# Patient Record
Sex: MALE | Race: WHITE | NOT HISPANIC OR LATINO | Employment: FULL TIME | URBAN - METROPOLITAN AREA
[De-identification: names, ages, dates, MRNs, and addresses within clinical notes are randomized per-mention and may not be internally consistent; named-entity substitution may affect disease eponyms.]

---

## 2020-10-09 ENCOUNTER — OFFICE VISIT CONVERTED (OUTPATIENT)
Dept: UROLOGY | Facility: CLINIC | Age: 45
End: 2020-10-09
Attending: UROLOGY

## 2020-10-09 ENCOUNTER — CONVERSION ENCOUNTER (OUTPATIENT)
Dept: SURGERY | Facility: CLINIC | Age: 45
End: 2020-10-09

## 2021-05-10 NOTE — H&P
History and Physical      Patient Name: Deniz Calderon   Patient ID: [id number]   Sex: Male   YOB: 1975    Primary Care Provider: Alfonso Boo MD   Referring Provider: Alfonso Boo MD    Visit Date: October 9, 2020    Provider: Kehinde Arce MD   Location: [facility]   Location Address: [address] [place] [address]  [id number]   Location Phone: [phone]          Chief Complaint  · The patient is here for a vasectomy consultation.      History Of Present Illness  Deniz Calderon is here for counseling regarding vasectomy for permanent sterilization.   The procedure was discussed with the patient. Deniz Calderon is aware that the procedure should be considered irreversible, although at times it can be reversed with success. Risks for the procedure including local effects of selling, bleeding, pain, the possibility for recanalization, and continued fertility is also possible. Formation of a sperm granuloma also is a possibility. We discussed the procedure, preoperative preparation, and postoperative care. We also discussed the importance of continuing to use contraception post vasectomy, since the patient will not be sterile at that point. We stressed the importance of continuing to use contraception until a follow-up semen specimen is done that shows the absence of sperm. That specimen will normally be obtained eight weeks post-surgery.   Deniz Calderon is voluntarily requesting this procedure and understands that if it is successful he will be unable to father children.   He has no cardiopulmonary history, no prior scrotal surgery, no blood thinners.       Review of Systems  · Constitutional  o Denies  o : fever, chills  · Eyes  o Denies  o : double vision, cataracts  · HENT  o Denies  o : headaches, hearing loss  · Cardiovascular  o Denies  o : chest pain, irregular heart beats, dyspnea on exertion, chest pain on exertion  · Respiratory  o Denies  o : shortness  "of breath, wheezing, cough  · Gastrointestinal  o Denies  o : nausea, vomiting, diarrhea, heartburn  · Genitourinary  o Denies  o : scrotal mass, penile discharge, scrotal abnormalities  · Integument  o Denies  o : rash, itching, skin lesion or lump  · Endocrine  o Denies  o : weight gain, weight loss  · Psychiatric  o Denies  o : anxiety, depression, difficulty sleeping      Vitals  Date Time BP Position Site L\R Cuff Size HR RR TEMP (F) WT  HT  BMI kg/m2 BSA m2 O2 Sat FR L/min FiO2        10/09/2020 02:27 PM       17  272lbs 6oz 5'  8\" 41.41 2.43             Physical Examination  · Constitutional  o Appearance  o : well developed, well-nourished, in no acute distress.   · Neck  o Inspection/Palpation  o : normal appearance, no masses or tenderness, trachea midline  · Respiratory  o Respiratory Effort  o : breathing unlabored  o Auscultation of Lungs  o : clear to ascultation bilaterally  · Cardiovascular  o Heart  o :   § Auscultation of Heart  § : regular rate and rhythm, no murmurs  · Gastrointestinal  o Abdominal Examination  o : nontender, nondistended, no rigidity or gaurding,no hepatosplenomegaly  · Genitourinary  o Bladder  o : nonpalpable  o Penis  o : normal circumcised phallus with no masses or lesions  o Urethral Meatus  o : no inflammation present and no stenosis  o Scrotum and Scrotal Contents  o :   § Scrotum  § : bilateral vas were palpable  § Testes  § : descended testicles no masses or lesions  · Neurologic  o Mental Status Examination  o :   § Orientation  § : grossly oriented to person, place and time, judgement and insight intact, normal mood and affect          Assessment  · Consultation for sterilization     V25.09/Z30.09      Plan  · Medications  o Medications have been Reconciled  o Transition of Care or Provider Policy  · Instructions  o The patient will schedule a vasectomy if he desires.  o Handouts were provided, vasectomy  scanned into the EMR for reference.   o Vasectomy is " intended to be a permanent form of contraception.  o Vasectomy does not produce immediate sterility.  o Following vasectomy, another form of contraception is required until vas occlusion is confirmed by post-vasectomy semen analysis (PVSA).  o Even after vas occlusion is confirmed, vasectomy is not 100% reliable in preventing pregnancy.  o The risk of pregnancy after vasectomy is approximately 1 in 2,000 for men who have no sperm in the semen on post-vasectomy semen analysis showing rare non-motile sperm (RNMS).  o Repeat vasectomy is necessary in < 1% of vasectomies, provided that a technique for vas occlusion known to have low occlusive failure rate has been used.   o Patient's should refrain from ejaculation for approximately 1 week after vasectomy.  o Options for fertility after vasectomy reversal and sperm retrival with in vitro fertilization. These options are not always successful, and are very expensive.   o The rates of surgical complications such as symptomatic hematoma and infection are 1-2%.  o Chronic scrotal pain associated with negative impact on quality of life occurs after vasectomy in about 1-2% of men. Few of these men require addtional surgery.   o Other permanent and non-permanent alternatives to vasectomy are avaliable.  o Patient voiced understanding of the above statements.  o Electronically Identified Patient Education Materials Provided Electronically            Electronically Signed by: Kehinde Arce MD -Author on October 9, 2020 02:38:06 PM

## 2021-05-14 VITALS — WEIGHT: 272.37 LBS | BODY MASS INDEX: 41.28 KG/M2 | RESPIRATION RATE: 17 BRPM | HEIGHT: 68 IN

## 2022-02-28 ENCOUNTER — LAB (OUTPATIENT)
Dept: LAB | Facility: HOSPITAL | Age: 47
End: 2022-02-28

## 2022-02-28 ENCOUNTER — OFFICE VISIT (OUTPATIENT)
Dept: INTERNAL MEDICINE | Facility: CLINIC | Age: 47
End: 2022-02-28

## 2022-02-28 DIAGNOSIS — Z11.59 NEED FOR HEPATITIS C SCREENING TEST: ICD-10-CM

## 2022-02-28 DIAGNOSIS — E89.0 POSTABLATIVE HYPOTHYROIDISM: ICD-10-CM

## 2022-02-28 DIAGNOSIS — R53.83 FATIGUE, UNSPECIFIED TYPE: ICD-10-CM

## 2022-02-28 DIAGNOSIS — B27.90 MONOSPOT TEST POSITIVE: ICD-10-CM

## 2022-02-28 DIAGNOSIS — Z13.220 SCREENING FOR LIPID DISORDERS: ICD-10-CM

## 2022-02-28 DIAGNOSIS — J02.9 SORE THROAT: ICD-10-CM

## 2022-02-28 DIAGNOSIS — Z00.00 ANNUAL PHYSICAL EXAM: ICD-10-CM

## 2022-02-28 DIAGNOSIS — J06.9 VIRAL URI: ICD-10-CM

## 2022-02-28 DIAGNOSIS — Z76.89 ENCOUNTER TO ESTABLISH CARE: Primary | ICD-10-CM

## 2022-02-28 DIAGNOSIS — Z12.11 SCREENING FOR COLON CANCER: ICD-10-CM

## 2022-02-28 DIAGNOSIS — Z01.89 ROUTINE LAB DRAW: ICD-10-CM

## 2022-02-28 DIAGNOSIS — R79.89 ELEVATED LFTS: ICD-10-CM

## 2022-02-28 DIAGNOSIS — R74.8 ELEVATED LIPASE: ICD-10-CM

## 2022-02-28 PROBLEM — E66.812 CLASS 2 OBESITY WITH BODY MASS INDEX (BMI) OF 37.0 TO 37.9 IN ADULT: Status: ACTIVE | Noted: 2021-05-09

## 2022-02-28 PROBLEM — Z83.49 FHX: THYROID DISEASE: Status: ACTIVE | Noted: 2020-10-24

## 2022-02-28 PROBLEM — Z13.6 SCREENING FOR HYPERTENSION: Status: ACTIVE | Noted: 2020-10-24

## 2022-02-28 PROBLEM — E05.00 GRAVES' DISEASE: Status: ACTIVE | Noted: 2020-10-24

## 2022-02-28 PROBLEM — E66.9 CLASS 2 OBESITY WITH BODY MASS INDEX (BMI) OF 37.0 TO 37.9 IN ADULT: Status: ACTIVE | Noted: 2021-05-09

## 2022-02-28 PROBLEM — J30.2 SEASONAL ALLERGIC RHINITIS: Status: ACTIVE | Noted: 2022-02-28

## 2022-02-28 PROBLEM — Z92.3 S/P RADIOACTIVE IODINE THYROID ABLATION: Status: ACTIVE | Noted: 2020-10-24

## 2022-02-28 PROBLEM — E03.9 HYPOTHYROIDISM: Status: ACTIVE | Noted: 2020-10-24

## 2022-02-28 PROBLEM — K21.9 GERD (GASTROESOPHAGEAL REFLUX DISEASE): Status: ACTIVE | Noted: 2020-10-24

## 2022-02-28 LAB
25(OH)D3 SERPL-MCNC: 44.7 NG/ML (ref 30–100)
ALBUMIN SERPL-MCNC: 4.5 G/DL (ref 3.5–5.2)
ALBUMIN/GLOB SERPL: 1.3 G/DL
ALP SERPL-CCNC: 538 U/L (ref 39–117)
ALT SERPL W P-5'-P-CCNC: 251 U/L (ref 1–41)
ANION GAP SERPL CALCULATED.3IONS-SCNC: 14.5 MMOL/L (ref 5–15)
AST SERPL-CCNC: 116 U/L (ref 1–40)
BACTERIA UR QL AUTO: NORMAL /HPF
BILIRUB SERPL-MCNC: 0.5 MG/DL (ref 0–1.2)
BILIRUB UR QL STRIP: NEGATIVE
BUN SERPL-MCNC: 10 MG/DL (ref 6–20)
BUN/CREAT SERPL: 9.1 (ref 7–25)
CALCIUM SPEC-SCNC: 9.7 MG/DL (ref 8.6–10.5)
CHLORIDE SERPL-SCNC: 103 MMOL/L (ref 98–107)
CHOLEST SERPL-MCNC: 176 MG/DL (ref 0–200)
CLARITY UR: CLEAR
CO2 SERPL-SCNC: 20.5 MMOL/L (ref 22–29)
COLOR UR: YELLOW
CREAT SERPL-MCNC: 1.1 MG/DL (ref 0.76–1.27)
DEPRECATED RDW RBC AUTO: 40.8 FL (ref 37–54)
EGFRCR SERPLBLD CKD-EPI 2021: 84.4 ML/MIN/1.73
ERYTHROCYTE [DISTWIDTH] IN BLOOD BY AUTOMATED COUNT: 12.9 % (ref 12.3–15.4)
EXPIRATION DATE: NORMAL
EXPIRATION DATE: NORMAL
FLUAV AG NPH QL: NEGATIVE
FLUBV AG NPH QL: NEGATIVE
FOLATE SERPL-MCNC: 17.9 NG/ML (ref 4.78–24.2)
GLOBULIN UR ELPH-MCNC: 3.4 GM/DL
GLUCOSE SERPL-MCNC: 76 MG/DL (ref 65–99)
GLUCOSE UR STRIP-MCNC: NEGATIVE MG/DL
HCT VFR BLD AUTO: 47 % (ref 37.5–51)
HCV AB SER DONR QL: NORMAL
HDLC SERPL-MCNC: 38 MG/DL (ref 40–60)
HGB BLD-MCNC: 16.2 G/DL (ref 13–17.7)
HGB UR QL STRIP.AUTO: NEGATIVE
HYALINE CASTS UR QL AUTO: NORMAL /LPF
INTERNAL CONTROL: NORMAL
INTERNAL CONTROL: NORMAL
KETONES UR QL STRIP: NEGATIVE
LDLC SERPL CALC-MCNC: 108 MG/DL (ref 0–100)
LDLC/HDLC SERPL: 2.74 {RATIO}
LEUKOCYTE ESTERASE UR QL STRIP.AUTO: NEGATIVE
LYMPHOCYTES # BLD MANUAL: 10.4 10*3/MM3 (ref 0.7–3.1)
LYMPHOCYTES NFR BLD MANUAL: 5 % (ref 5–12)
Lab: NORMAL
Lab: NORMAL
MCH RBC QN AUTO: 30.3 PG (ref 26.6–33)
MCHC RBC AUTO-ENTMCNC: 34.5 G/DL (ref 31.5–35.7)
MCV RBC AUTO: 88 FL (ref 79–97)
MONOCYTES # BLD: 0.8 10*3/MM3 (ref 0.1–0.9)
NEUTROPHILS # BLD AUTO: 4.8 10*3/MM3 (ref 1.7–7)
NEUTROPHILS NFR BLD MANUAL: 30 % (ref 42.7–76)
NITRITE UR QL STRIP: NEGATIVE
PH UR STRIP.AUTO: 5.5 [PH] (ref 5–8)
PLAT MORPH BLD: NORMAL
PLATELET # BLD AUTO: 250 10*3/MM3 (ref 140–450)
PMV BLD AUTO: 10 FL (ref 6–12)
POTASSIUM SERPL-SCNC: 4.6 MMOL/L (ref 3.5–5.2)
PROT SERPL-MCNC: 7.9 G/DL (ref 6–8.5)
PROT UR QL STRIP: NEGATIVE
RBC # BLD AUTO: 5.34 10*6/MM3 (ref 4.14–5.8)
RBC # UR STRIP: NORMAL /HPF
RBC MORPH BLD: NORMAL
REF LAB TEST METHOD: NORMAL
S PYO AG THROAT QL: NEGATIVE
SARS-COV-2 RNA PNL SPEC NAA+PROBE: NOT DETECTED
SODIUM SERPL-SCNC: 138 MMOL/L (ref 136–145)
SP GR UR STRIP: 1.02 (ref 1–1.03)
SQUAMOUS #/AREA URNS HPF: NORMAL /HPF
T4 FREE SERPL-MCNC: 1.63 NG/DL (ref 0.93–1.7)
TRIGL SERPL-MCNC: 170 MG/DL (ref 0–150)
TSH SERPL DL<=0.05 MIU/L-ACNC: 0.71 UIU/ML (ref 0.27–4.2)
UROBILINOGEN UR QL STRIP: NORMAL
VARIANT LYMPHS NFR BLD MANUAL: 20 % (ref 0–5)
VARIANT LYMPHS NFR BLD MANUAL: 45 % (ref 19.6–45.3)
VIT B12 BLD-MCNC: 1919 PG/ML (ref 211–946)
VLDLC SERPL-MCNC: 30 MG/DL (ref 5–40)
WBC # UR STRIP: NORMAL /HPF
WBC MORPH BLD: NORMAL
WBC NRBC COR # BLD: 16 10*3/MM3 (ref 3.4–10.8)

## 2022-02-28 PROCEDURE — 85007 BL SMEAR W/DIFF WBC COUNT: CPT

## 2022-02-28 PROCEDURE — 99386 PREV VISIT NEW AGE 40-64: CPT

## 2022-02-28 PROCEDURE — 87880 STREP A ASSAY W/OPTIC: CPT

## 2022-02-28 PROCEDURE — 84439 ASSAY OF FREE THYROXINE: CPT

## 2022-02-28 PROCEDURE — 82607 VITAMIN B-12: CPT

## 2022-02-28 PROCEDURE — 80061 LIPID PANEL: CPT

## 2022-02-28 PROCEDURE — 82306 VITAMIN D 25 HYDROXY: CPT

## 2022-02-28 PROCEDURE — 81001 URINALYSIS AUTO W/SCOPE: CPT

## 2022-02-28 PROCEDURE — 36415 COLL VENOUS BLD VENIPUNCTURE: CPT

## 2022-02-28 PROCEDURE — C9803 HOPD COVID-19 SPEC COLLECT: HCPCS

## 2022-02-28 PROCEDURE — 86803 HEPATITIS C AB TEST: CPT

## 2022-02-28 PROCEDURE — 82746 ASSAY OF FOLIC ACID SERUM: CPT

## 2022-02-28 PROCEDURE — 83690 ASSAY OF LIPASE: CPT

## 2022-02-28 PROCEDURE — U0004 COV-19 TEST NON-CDC HGH THRU: HCPCS

## 2022-02-28 PROCEDURE — 87804 INFLUENZA ASSAY W/OPTIC: CPT

## 2022-02-28 PROCEDURE — 80050 GENERAL HEALTH PANEL: CPT

## 2022-02-28 PROCEDURE — 82150 ASSAY OF AMYLASE: CPT

## 2022-02-28 PROCEDURE — 86308 HETEROPHILE ANTIBODY SCREEN: CPT

## 2022-02-28 RX ORDER — MULTIPLE VITAMINS W/ MINERALS TAB 9MG-400MCG
1 TAB ORAL DAILY
COMMUNITY

## 2022-02-28 RX ORDER — LEVOTHYROXINE SODIUM 175 UG/1
150 TABLET ORAL DAILY
COMMUNITY
Start: 2021-11-03 | End: 2023-03-21

## 2022-02-28 RX ORDER — UBIDECARENONE 75 MG
50 CAPSULE ORAL EVERY OTHER DAY
COMMUNITY
End: 2023-03-21

## 2022-02-28 RX ORDER — PREDNISONE 20 MG/1
20 TABLET ORAL 2 TIMES DAILY
Qty: 10 TABLET | Refills: 0 | Status: SHIPPED | OUTPATIENT
Start: 2022-02-28 | End: 2022-03-05

## 2022-02-28 RX ORDER — MELATONIN
1000 DAILY
COMMUNITY
End: 2023-03-21

## 2022-02-28 NOTE — ASSESSMENT & PLAN NOTE
He is followed by Dr. Chou, s/p radioactive iodine thyroid ablation.   He continues with synthroid 175 mcg qam.  Dr. Chou monitors thyroid labs q6 months.   Continue with Dr. Effie curry.

## 2022-02-28 NOTE — ASSESSMENT & PLAN NOTE
Pt complains of sore throat and headache. He states that he started with symptoms about a week and half asgo. He states he work up in the middle of the night with abdominal pain and ended up vomiting. He thought that it was something he ate. That resolved but he continued having headaches, night sweats/chills, and now he is having a really sore throat.  He complains of ear fullness and some nasal congestion. He denies soa, chest tightness, or cough. He denies any current nausea, vomiting or diarrhea. Wife tested positive for COVID not too long ago.  Flu and Strep in office were negative.   Plan: Pt to get labs and covid swab. I will send in chloraseptic spray as well. Pt may take tylenol/motrin for pain. He will also start claritin/zyrtec.  F/u in 1 week.

## 2022-02-28 NOTE — PROGRESS NOTES
Chief Complaint  Headache (Off and on x 1 1/2 weeks), Night Sweats (Horrible), Fatigue, Sore Throat (x 2 days, painful to swallow, no cough), and Chills (Not recent, no fever)    Subjective          History of Present Illness  Deniz Calderon presents to [facility]  To establish care.     Medical problems include Graves' disease, s/p thyroid ablation.     He is followed by Dr. Chou.     He is c/o of sore throat and headache. Started with symptoms 10 days ago. He also has had night sweats and chills. No known fever, denies soa, cough.     Tobacco use-No  He does exercise.   Colon-We will order  COVID-19-UTD  Flu-Fall 2021    Denies cp, soa.   Objective   Vital Signs:   There were no vitals taken for this visit.    Physical Exam  HENT:      Head: Normocephalic and atraumatic.      Right Ear: A middle ear effusion is present.      Left Ear: A middle ear effusion is present.      Nose:      Right Sinus: No maxillary sinus tenderness or frontal sinus tenderness.      Left Sinus: No maxillary sinus tenderness or frontal sinus tenderness.      Mouth/Throat:      Pharynx: Oropharyngeal exudate and posterior oropharyngeal erythema present.   Eyes:      Extraocular Movements: Extraocular movements intact.      Conjunctiva/sclera: Conjunctivae normal.   Cardiovascular:      Rate and Rhythm: Tachycardia present.      Pulses: Normal pulses.   Pulmonary:      Effort: Pulmonary effort is normal. No respiratory distress.      Breath sounds: Normal breath sounds. No stridor. No wheezing, rhonchi or rales.   Abdominal:      General: Bowel sounds are normal. There is no distension.      Palpations: Abdomen is soft. There is no mass.      Tenderness: There is no abdominal tenderness.      Hernia: No hernia is present.   Musculoskeletal:         General: Normal range of motion.      Cervical back: Normal range of motion and neck supple.   Skin:     General: Skin is warm and dry.   Neurological:       Mental Status: He is alert and oriented to person, place, and time.   Psychiatric:         Mood and Affect: Mood normal.         Behavior: Behavior normal.         Thought Content: Thought content normal.         Judgment: Judgment normal.        Result Review :                 Assessment and Plan    Diagnoses and all orders for this visit:    1. Encounter to establish care (Primary)    2. Postablative hypothyroidism  Assessment & Plan:  He is followed by Dr. Chou, s/p radioactive iodine thyroid ablation.   He continues with synthroid 175 mcg qam.  Dr. Chou monitors thyroid labs q6 months.   Continue with Dr. Effie curry.     Orders:  -     TSH+Free T4    3. Viral URI  Assessment & Plan:  Pt complains of sore throat and headache. He states that he started with symptoms about a week and half asgo. He states he work up in the middle of the night with abdominal pain and ended up vomiting. He thought that it was something he ate. That resolved but he continued having headaches, night sweats/chills, and now he is having a really sore throat.  He complains of ear fullness and some nasal congestion. He denies soa, chest tightness, or cough. He denies any current nausea, vomiting or diarrhea. Wife tested positive for COVID not too long ago.  Flu and Strep in office were negative.   Plan: Pt to get labs and covid swab. I will send in chloraseptic spray as well. Pt may take tylenol/motrin for pain. He will also start claritin/zyrtec.  F/u in 1 week.       Orders:  -     POCT Influenza A/B  -     POCT rapid strep A    4. Need for hepatitis C screening test  -     Hepatitis C antibody    5. Annual physical exam    6. Routine lab draw  -     Comprehensive Metabolic Panel  -     Urinalysis With Microscopic - Urine, Clean Catch    7. Fatigue, unspecified type  -     CBC & Differential  -     Vitamin B12 & Folate  -     Vitamin D 25 Hydroxy  -     POCT Influenza A/B  -     POCT rapid strep A  -     Manual Differential    8.  Screening for lipid disorders  -     Lipid Panel    9. Sore throat  -     COVID-19,APTIMA PANTHER(AMADO),[facility]/ JULIOCESAR, NP/OP SWAB IN UTM/VTM/[facility] TRANSPORT MEDIA,24 HR TAT - Swab, Nasopharynx  -     POCT Influenza A/B  -     POCT rapid strep A    10. Screening for colon cancer  -     Cologuard - Stool, Per Rectum; Future    Other orders  -     phenol (CHLORASEPTIC) 1.4 % liquid liquid; Apply 2 sprays to the mouth or throat Every 2 (Two) Hours As Needed (sore throat).  Dispense: 120 mL; Refill: 0  -     predniSONE (DELTASONE) 20 MG tablet; Take 1 tablet by mouth 2 (Two) Times a Day for 5 days.  Dispense: 10 tablet; Refill: 0      Follow Up   Return in about 1 week (around 3/7/2022) for Recheck.  Patient was given instructions and counseling regarding his condition or for health maintenance advice. Please see specific information pulled into the AVS if appropriate.

## 2022-03-01 DIAGNOSIS — R74.8 ABNORMAL SERUM LEVEL OF LIPASE: ICD-10-CM

## 2022-03-01 DIAGNOSIS — Z83.49 FHX: THYROID DISEASE: Primary | ICD-10-CM

## 2022-03-01 DIAGNOSIS — Z13.220 SCREENING FOR LIPID DISORDERS: ICD-10-CM

## 2022-03-01 DIAGNOSIS — R79.89 ELEVATED LIVER FUNCTION TESTS: ICD-10-CM

## 2022-03-01 LAB
AMYLASE SERPL-CCNC: 92 U/L (ref 28–100)
LIPASE SERPL-CCNC: 73 U/L (ref 13–60)

## 2022-03-02 ENCOUNTER — APPOINTMENT (OUTPATIENT)
Dept: CT IMAGING | Facility: HOSPITAL | Age: 47
End: 2022-03-02

## 2022-03-02 ENCOUNTER — LAB (OUTPATIENT)
Dept: LAB | Facility: HOSPITAL | Age: 47
End: 2022-03-02

## 2022-03-02 ENCOUNTER — ANCILLARY ORDERS (OUTPATIENT)
Dept: INTERNAL MEDICINE | Facility: CLINIC | Age: 47
End: 2022-03-02

## 2022-03-02 ENCOUNTER — TELEPHONE (OUTPATIENT)
Dept: INTERNAL MEDICINE | Facility: CLINIC | Age: 47
End: 2022-03-02

## 2022-03-02 ENCOUNTER — HOSPITAL ENCOUNTER (OUTPATIENT)
Dept: ULTRASOUND IMAGING | Facility: HOSPITAL | Age: 47
Discharge: HOME | End: 2022-03-02

## 2022-03-02 ENCOUNTER — HOSPITAL ENCOUNTER (OUTPATIENT)
Dept: CT IMAGING | Facility: HOSPITAL | Age: 47
Discharge: HOME | End: 2022-03-02

## 2022-03-02 DIAGNOSIS — J02.9 SORE THROAT: ICD-10-CM

## 2022-03-02 DIAGNOSIS — R16.2 HEPATOSPLENOMEGALY: Primary | ICD-10-CM

## 2022-03-02 DIAGNOSIS — R79.89 ELEVATED LFTS: ICD-10-CM

## 2022-03-02 DIAGNOSIS — R79.89 ELEVATED LIVER FUNCTION TESTS: ICD-10-CM

## 2022-03-02 DIAGNOSIS — R74.8 ABNORMAL SERUM LEVEL OF LIPASE: ICD-10-CM

## 2022-03-02 DIAGNOSIS — R53.83 FATIGUE, UNSPECIFIED TYPE: ICD-10-CM

## 2022-03-02 DIAGNOSIS — B27.90 MONOSPOT TEST POSITIVE: ICD-10-CM

## 2022-03-02 LAB
ALBUMIN SERPL-MCNC: 4.2 G/DL (ref 3.5–5.2)
ALBUMIN/GLOB SERPL: 1.1 G/DL
ALP SERPL-CCNC: 417 U/L (ref 39–117)
ALPHA-FETOPROTEIN: 0.97 NG/ML (ref 0–8.3)
ALT SERPL W P-5'-P-CCNC: 195 U/L (ref 1–41)
ANION GAP SERPL CALCULATED.3IONS-SCNC: 14.3 MMOL/L (ref 5–15)
AST SERPL-CCNC: 74 U/L (ref 1–40)
BILIRUB SERPL-MCNC: 0.4 MG/DL (ref 0–1.2)
BUN SERPL-MCNC: 16 MG/DL (ref 6–20)
BUN/CREAT SERPL: 14.2 (ref 7–25)
CALCIUM SPEC-SCNC: 9.7 MG/DL (ref 8.6–10.5)
CERULOPLASMIN SERPL-MCNC: 38 MG/DL (ref 16–31)
CHLORIDE SERPL-SCNC: 102 MMOL/L (ref 98–107)
CO2 SERPL-SCNC: 23.7 MMOL/L (ref 22–29)
CREAT SERPL-MCNC: 1.13 MG/DL (ref 0.76–1.27)
EGFRCR SERPLBLD CKD-EPI 2021: 81.7 ML/MIN/1.73
FERRITIN SERPL-MCNC: 496.4 NG/ML (ref 30–400)
GLOBULIN UR ELPH-MCNC: 3.9 GM/DL
GLUCOSE SERPL-MCNC: 92 MG/DL (ref 65–99)
HAV IGM SERPL QL IA: NORMAL
HBV CORE IGM SERPL QL IA: NORMAL
HBV SURFACE AG SERPL QL IA: NORMAL
HCV AB SER DONR QL: NORMAL
HETEROPH AB SER QL LA: POSITIVE
INR PPP: 1.03 (ref 2–3)
IRON 24H UR-MRATE: 148 MCG/DL (ref 59–158)
IRON SATN MFR SERPL: 38 % (ref 20–50)
POTASSIUM SERPL-SCNC: 4.3 MMOL/L (ref 3.5–5.2)
PROT SERPL-MCNC: 8.1 G/DL (ref 6–8.5)
PROTHROMBIN TIME: 10.8 SECONDS (ref 9.4–12)
SODIUM SERPL-SCNC: 140 MMOL/L (ref 136–145)
TIBC SERPL-MCNC: 384 MCG/DL (ref 298–536)
TRANSFERRIN SERPL-MCNC: 258 MG/DL (ref 200–360)

## 2022-03-02 PROCEDURE — 82728 ASSAY OF FERRITIN: CPT

## 2022-03-02 PROCEDURE — 86381 MITOCHONDRIAL ANTIBODY EACH: CPT

## 2022-03-02 PROCEDURE — 74177 CT ABD & PELVIS W/CONTRAST: CPT

## 2022-03-02 PROCEDURE — 86663 EPSTEIN-BARR ANTIBODY: CPT

## 2022-03-02 PROCEDURE — 85610 PROTHROMBIN TIME: CPT

## 2022-03-02 PROCEDURE — 84156 ASSAY OF PROTEIN URINE: CPT

## 2022-03-02 PROCEDURE — 0 IOPAMIDOL PER 1 ML

## 2022-03-02 PROCEDURE — 80053 COMPREHEN METABOLIC PANEL: CPT

## 2022-03-02 PROCEDURE — 36415 COLL VENOUS BLD VENIPUNCTURE: CPT

## 2022-03-02 PROCEDURE — 86665 EPSTEIN-BARR CAPSID VCA: CPT

## 2022-03-02 PROCEDURE — 82390 ASSAY OF CERULOPLASMIN: CPT

## 2022-03-02 PROCEDURE — 86038 ANTINUCLEAR ANTIBODIES: CPT

## 2022-03-02 PROCEDURE — 86225 DNA ANTIBODY NATIVE: CPT

## 2022-03-02 PROCEDURE — 83540 ASSAY OF IRON: CPT

## 2022-03-02 PROCEDURE — 86664 EPSTEIN-BARR NUCLEAR ANTIGEN: CPT

## 2022-03-02 PROCEDURE — 80074 ACUTE HEPATITIS PANEL: CPT

## 2022-03-02 PROCEDURE — 84166 PROTEIN E-PHORESIS/URINE/CSF: CPT

## 2022-03-02 PROCEDURE — 82105 ALPHA-FETOPROTEIN SERUM: CPT

## 2022-03-02 PROCEDURE — 84466 ASSAY OF TRANSFERRIN: CPT

## 2022-03-02 PROCEDURE — 86335 IMMUNFIX E-PHORSIS/URINE/CSF: CPT

## 2022-03-02 RX ADMIN — IOPAMIDOL 100 ML: 755 INJECTION, SOLUTION INTRAVENOUS at 13:04

## 2022-03-03 ENCOUNTER — HOSPITAL ENCOUNTER (OUTPATIENT)
Dept: ULTRASOUND IMAGING | Facility: HOSPITAL | Age: 47
Discharge: HOME | End: 2022-03-03

## 2022-03-03 LAB
DSDNA IGG SERPL IA-ACNC: NEGATIVE [IU]/ML
EBV EA IGG SER-ACNC: >150 U/ML (ref 0–8.9)
EBV NA IGG SER IA-ACNC: <18 U/ML (ref 0–17.9)
EBV VCA IGG SER IA-ACNC: 139 U/ML (ref 0–17.9)
EBV VCA IGM SER IA-ACNC: >160 U/ML (ref 0–35.9)
MITOCHONDRIA M2 IGG SER-ACNC: <20 UNITS (ref 0–20)
NUCLEAR IGG SER IA-RTO: NEGATIVE

## 2022-03-03 PROCEDURE — 76705 ECHO EXAM OF ABDOMEN: CPT

## 2022-03-04 ENCOUNTER — PATIENT ROUNDING (BHMG ONLY) (OUTPATIENT)
Dept: INTERNAL MEDICINE | Facility: CLINIC | Age: 47
End: 2022-03-04

## 2022-03-04 LAB
ALBUMIN 24H MFR UR ELPH: 27.6 %
ALPHA1 GLOB 24H MFR UR ELPH: 4.2 %
ALPHA2 GLOB 24H MFR UR ELPH: 23.9 %
B-GLOBULIN MFR UR ELPH: 23.4 %
GAMMA GLOB 24H MFR UR ELPH: 21 %
HIV 1 & 2 AB SER-IMP: NORMAL
INTERPRETATION UR IFE-IMP: NORMAL
M PROTEIN 24H MFR UR ELPH: NORMAL %
PROT UR-MCNC: 15.9 MG/DL

## 2022-03-04 NOTE — PROGRESS NOTES
March 4, 2022    Hello, may I speak with Deniz Calderon?    My name is Lila Mast     I am  with [facility]  [address] [address].    Before we get started may I verify your date of birth? 1975    I am calling to officially welcome you to our practice and ask about your recent visit. Is this a good time to talk? yes    Tell me about your visit with us. What things went well?  Went well, no complaints       We're always looking for ways to make our patients' experiences even better. Do you have recommendations on ways we may improve?  no    Overall were you satisfied with your first visit to our practice? yes       I appreciate you taking the time to speak with me today. Is there anything else I can do for you? no      Thank you, and have a great day.

## 2022-03-07 ENCOUNTER — LAB (OUTPATIENT)
Dept: LAB | Facility: HOSPITAL | Age: 47
End: 2022-03-07

## 2022-03-07 ENCOUNTER — OFFICE VISIT (OUTPATIENT)
Dept: INTERNAL MEDICINE | Facility: CLINIC | Age: 47
End: 2022-03-07

## 2022-03-07 VITALS
DIASTOLIC BLOOD PRESSURE: 76 MMHG | OXYGEN SATURATION: 97 % | HEART RATE: 76 BPM | HEIGHT: 69 IN | WEIGHT: 234.6 LBS | SYSTOLIC BLOOD PRESSURE: 118 MMHG | TEMPERATURE: 98.4 F | BODY MASS INDEX: 34.75 KG/M2

## 2022-03-07 DIAGNOSIS — R79.89 ELEVATED LFTS: Primary | ICD-10-CM

## 2022-03-07 DIAGNOSIS — R16.2 HEPATOSPLENOMEGALY: ICD-10-CM

## 2022-03-07 LAB
ALBUMIN SERPL-MCNC: 4.3 G/DL (ref 3.5–5.2)
ALBUMIN/GLOB SERPL: 1.4 G/DL
ALP SERPL-CCNC: 226 U/L (ref 39–117)
ALT SERPL W P-5'-P-CCNC: 67 U/L (ref 1–41)
ANION GAP SERPL CALCULATED.3IONS-SCNC: 9.9 MMOL/L (ref 5–15)
AST SERPL-CCNC: 29 U/L (ref 1–40)
BASOPHILS # BLD MANUAL: 0.09 10*3/MM3 (ref 0–0.2)
BASOPHILS NFR BLD MANUAL: 1 % (ref 0–1.5)
BILIRUB SERPL-MCNC: 0.3 MG/DL (ref 0–1.2)
BUN SERPL-MCNC: 18 MG/DL (ref 6–20)
BUN/CREAT SERPL: 17.6 (ref 7–25)
CALCIUM SPEC-SCNC: 9.6 MG/DL (ref 8.6–10.5)
CHLORIDE SERPL-SCNC: 105 MMOL/L (ref 98–107)
CO2 SERPL-SCNC: 25.1 MMOL/L (ref 22–29)
CREAT SERPL-MCNC: 1.02 MG/DL (ref 0.76–1.27)
DEPRECATED RDW RBC AUTO: 41.4 FL (ref 37–54)
EGFRCR SERPLBLD CKD-EPI 2021: 92.4 ML/MIN/1.73
EOSINOPHIL # BLD MANUAL: 0.17 10*3/MM3 (ref 0–0.4)
EOSINOPHIL NFR BLD MANUAL: 2 % (ref 0.3–6.2)
ERYTHROCYTE [DISTWIDTH] IN BLOOD BY AUTOMATED COUNT: 12.9 % (ref 12.3–15.4)
GLOBULIN UR ELPH-MCNC: 3.1 GM/DL
GLUCOSE SERPL-MCNC: 75 MG/DL (ref 65–99)
HCT VFR BLD AUTO: 46.1 % (ref 37.5–51)
HGB BLD-MCNC: 15.5 G/DL (ref 13–17.7)
INR PPP: 1.06 (ref 2–3)
LYMPHOCYTES # BLD MANUAL: 5.36 10*3/MM3 (ref 0.7–3.1)
LYMPHOCYTES NFR BLD MANUAL: 6 % (ref 5–12)
MCH RBC QN AUTO: 30 PG (ref 26.6–33)
MCHC RBC AUTO-ENTMCNC: 33.6 G/DL (ref 31.5–35.7)
MCV RBC AUTO: 89.2 FL (ref 79–97)
MONOCYTES # BLD: 0.51 10*3/MM3 (ref 0.1–0.9)
NEUTROPHILS # BLD AUTO: 2.38 10*3/MM3 (ref 1.7–7)
NEUTROPHILS NFR BLD MANUAL: 28 % (ref 42.7–76)
PLAT MORPH BLD: NORMAL
PLATELET # BLD AUTO: 389 10*3/MM3 (ref 140–450)
PMV BLD AUTO: 9.2 FL (ref 6–12)
POTASSIUM SERPL-SCNC: 4.9 MMOL/L (ref 3.5–5.2)
PROT SERPL-MCNC: 7.4 G/DL (ref 6–8.5)
PROTHROMBIN TIME: 11 SECONDS (ref 9.4–12)
RBC # BLD AUTO: 5.17 10*6/MM3 (ref 4.14–5.8)
RBC MORPH BLD: NORMAL
SODIUM SERPL-SCNC: 140 MMOL/L (ref 136–145)
VARIANT LYMPHS NFR BLD MANUAL: 63 % (ref 19.6–45.3)
WBC MORPH BLD: NORMAL
WBC NRBC COR # BLD: 8.5 10*3/MM3 (ref 3.4–10.8)

## 2022-03-07 PROCEDURE — 80053 COMPREHEN METABOLIC PANEL: CPT

## 2022-03-07 PROCEDURE — 36415 COLL VENOUS BLD VENIPUNCTURE: CPT

## 2022-03-07 PROCEDURE — 99213 OFFICE O/P EST LOW 20 MIN: CPT

## 2022-03-07 PROCEDURE — 85025 COMPLETE CBC W/AUTO DIFF WBC: CPT

## 2022-03-07 PROCEDURE — 85007 BL SMEAR W/DIFF WBC COUNT: CPT

## 2022-03-07 PROCEDURE — 85610 PROTHROMBIN TIME: CPT

## 2022-03-07 NOTE — ASSESSMENT & PLAN NOTE
Patient had elevated LFTs on recent labs. Additional labs were ordered to investigate the cause of the elevated LFTs. I rechecked him a couple days later and they are trending down.  Patient had a CT abdomen and it did show hepatosplenomegaly.  He had a right upper quadrant ultrasound and that was unremarkable.    Mono spot and EBV panel came back positive.  Discussed case with GI and referral has been sent.   Patient denies any abdominal pain, nausea, vomiting, diarrhea.  Plan: Monitor labs until patient follows up with GI.  Patient counseled.  Avoid physical activity until follow-up with gastro.

## 2022-03-07 NOTE — PROGRESS NOTES
"Chief Complaint  Follow-up (US and Ct was done.) and Labs Only    Subjective          History of Present Illness  Deniz Calderon presents to [facility]  To follow up and discuss labs.   He states he is feeling much better overall. Headache has resolved and sore throat is much improved.  Recent labs showed elevated LFT's and Lipase; therefore, further labs and imaging studies were ordered.   CT abd showed hepatosplenomegaly and RUQ US was unremarkable.  Pt denies any abdominal pain, nausea, vomiting, or diarrhea.  He denies any chest pain, soa or palpitations.    Denies excess alcohol use.  Objective   Vital Signs:   /76 (BP Location: Right arm, Patient Position: Sitting, Cuff Size: Large Adult)   Pulse 76   Temp 98.4 °F (36.9 °C) (Temporal)   Ht 174 cm (68.5\")   Wt 106 kg (234 lb 9.6 oz)   SpO2 97%   BMI 35.15 kg/m²     Physical Exam  Constitutional:       Appearance: Normal appearance.   Eyes:      Extraocular Movements: Extraocular movements intact.      Conjunctiva/sclera: Conjunctivae normal.   Cardiovascular:      Rate and Rhythm: Normal rate and regular rhythm.      Heart sounds: Normal heart sounds. No murmur heard.  Pulmonary:      Effort: Pulmonary effort is normal.      Breath sounds: Normal breath sounds.   Abdominal:      General: Bowel sounds are normal. There is no distension.      Palpations: Abdomen is soft.      Tenderness: There is no abdominal tenderness.   Musculoskeletal:         General: Normal range of motion.      Cervical back: Normal range of motion.      Right lower leg: No edema.      Left lower leg: No edema.   Skin:     General: Skin is warm and dry.   Neurological:      Mental Status: He is alert and oriented to person, place, and time.   Psychiatric:         Mood and Affect: Mood normal.         Behavior: Behavior normal.         Thought Content: Thought content normal.         Judgment: Judgment normal.        Result Review :   The " following data was reviewed by: SERAFIN Dugan on 03/07/2022:  CMP    CMP 2/28/22 3/2/22   Glucose 76 92   BUN 10 16   Creatinine 1.10 1.13   Sodium 138 140   Potassium 4.6 4.3   Chloride 103 102   Calcium 9.7 9.7   Albumin 4.50 4.20   Total Bilirubin 0.5 0.4   Alkaline Phosphatase 538 (A) 417 (A)   AST (SGOT) 116 (A) 74 (A)   ALT (SGPT) 251 (A) 195 (A)   (A) Abnormal value            CBC w/diff    CBC w/Diff 2/28/22   WBC 16.00 (A)   RBC 5.34   Hemoglobin 16.2   Hematocrit 47.0   MCV 88.0   MCH 30.3   MCHC 34.5   RDW 12.9   Platelets 250   (A) Abnormal value            Lipid Panel    Lipid Panel 2/28/22   Total Cholesterol 176   Triglycerides 170 (A)   HDL Cholesterol 38 (A)   VLDL Cholesterol 30   LDL Cholesterol  108 (A)   LDL/HDL Ratio 2.74   (A) Abnormal value            TSH    TSH 2/28/22   TSH 0.715               Data reviewed: Radiologic studies CT abd, RUQ US          Assessment and Plan    Diagnoses and all orders for this visit:    1. Elevated LFTs (Primary)  Assessment & Plan:  Patient had elevated LFTs on recent labs. Additional labs were ordered to investigate the cause of the elevated LFTs. I rechecked him a couple days later and they are trending down.  Patient had a CT abdomen and it did show hepatosplenomegaly.  He had a right upper quadrant ultrasound and that was unremarkable.    Mono spot and EBV panel came back positive.  Discussed case with GI and referral has been sent.   Patient denies any abdominal pain, nausea, vomiting, diarrhea.  Plan: Monitor labs until patient follows up with GI.  Patient counseled.  Avoid physical activity until follow-up with gastro.    Orders:  -     CBC & Differential  -     Comprehensive Metabolic Panel  -     Protime-INR  -     CBC & Differential; Future  -     Comprehensive metabolic panel; Future  -     Protime-INR; Future    2. Hepatosplenomegaly  -     CBC & Differential  -     Comprehensive Metabolic Panel  -     Protime-INR  -     CBC & Differential;  Future  -     Comprehensive metabolic panel; Future  -     Protime-INR; Future      Follow Up   Return in about 6 months (around 9/7/2022).  Patient was given instructions and counseling regarding his condition or for health maintenance advice. Please see specific information pulled into the AVS if appropriate.

## 2022-03-17 ENCOUNTER — LAB (OUTPATIENT)
Dept: LAB | Facility: HOSPITAL | Age: 47
End: 2022-03-17

## 2022-03-17 DIAGNOSIS — R16.2 HEPATOSPLENOMEGALY: ICD-10-CM

## 2022-03-17 DIAGNOSIS — R79.89 ELEVATED LFTS: ICD-10-CM

## 2022-03-17 LAB
ALBUMIN SERPL-MCNC: 4.4 G/DL (ref 3.5–5.2)
ALBUMIN/GLOB SERPL: 1.5 G/DL
ALP SERPL-CCNC: 121 U/L (ref 39–117)
ALT SERPL W P-5'-P-CCNC: 42 U/L (ref 1–41)
ANION GAP SERPL CALCULATED.3IONS-SCNC: 8.9 MMOL/L (ref 5–15)
AST SERPL-CCNC: 29 U/L (ref 1–40)
BASOPHILS # BLD AUTO: 0.07 10*3/MM3 (ref 0–0.2)
BASOPHILS NFR BLD AUTO: 1.2 % (ref 0–1.5)
BILIRUB SERPL-MCNC: 0.4 MG/DL (ref 0–1.2)
BUN SERPL-MCNC: 21 MG/DL (ref 6–20)
BUN/CREAT SERPL: 19.8 (ref 7–25)
CALCIUM SPEC-SCNC: 9.6 MG/DL (ref 8.6–10.5)
CHLORIDE SERPL-SCNC: 105 MMOL/L (ref 98–107)
CO2 SERPL-SCNC: 25.1 MMOL/L (ref 22–29)
CREAT SERPL-MCNC: 1.06 MG/DL (ref 0.76–1.27)
DEPRECATED RDW RBC AUTO: 42.6 FL (ref 37–54)
EGFRCR SERPLBLD CKD-EPI 2021: 87.7 ML/MIN/1.73
EOSINOPHIL # BLD AUTO: 0.1 10*3/MM3 (ref 0–0.4)
EOSINOPHIL NFR BLD AUTO: 1.7 % (ref 0.3–6.2)
ERYTHROCYTE [DISTWIDTH] IN BLOOD BY AUTOMATED COUNT: 13 % (ref 12.3–15.4)
GLOBULIN UR ELPH-MCNC: 2.9 GM/DL
GLUCOSE SERPL-MCNC: 77 MG/DL (ref 65–99)
HCT VFR BLD AUTO: 43.7 % (ref 37.5–51)
HGB BLD-MCNC: 14.3 G/DL (ref 13–17.7)
IMM GRANULOCYTES # BLD AUTO: 0.01 10*3/MM3 (ref 0–0.05)
IMM GRANULOCYTES NFR BLD AUTO: 0.2 % (ref 0–0.5)
INR PPP: 1.06 (ref 2–3)
LYMPHOCYTES # BLD AUTO: 2.56 10*3/MM3 (ref 0.7–3.1)
LYMPHOCYTES NFR BLD AUTO: 43.8 % (ref 19.6–45.3)
MCH RBC QN AUTO: 29.5 PG (ref 26.6–33)
MCHC RBC AUTO-ENTMCNC: 32.7 G/DL (ref 31.5–35.7)
MCV RBC AUTO: 90.3 FL (ref 79–97)
MONOCYTES # BLD AUTO: 0.43 10*3/MM3 (ref 0.1–0.9)
MONOCYTES NFR BLD AUTO: 7.4 % (ref 5–12)
NEUTROPHILS NFR BLD AUTO: 2.67 10*3/MM3 (ref 1.7–7)
NEUTROPHILS NFR BLD AUTO: 45.7 % (ref 42.7–76)
NRBC BLD AUTO-RTO: 0 /100 WBC (ref 0–0.2)
PLATELET # BLD AUTO: 262 10*3/MM3 (ref 140–450)
PMV BLD AUTO: 9.7 FL (ref 6–12)
POTASSIUM SERPL-SCNC: 4.5 MMOL/L (ref 3.5–5.2)
PROT SERPL-MCNC: 7.3 G/DL (ref 6–8.5)
PROTHROMBIN TIME: 11 SECONDS (ref 9.4–12)
RBC # BLD AUTO: 4.84 10*6/MM3 (ref 4.14–5.8)
SODIUM SERPL-SCNC: 139 MMOL/L (ref 136–145)
WBC NRBC COR # BLD: 5.84 10*3/MM3 (ref 3.4–10.8)

## 2022-03-17 PROCEDURE — 85025 COMPLETE CBC W/AUTO DIFF WBC: CPT

## 2022-03-17 PROCEDURE — 85610 PROTHROMBIN TIME: CPT

## 2022-03-17 PROCEDURE — 80053 COMPREHEN METABOLIC PANEL: CPT

## 2022-03-17 PROCEDURE — 36415 COLL VENOUS BLD VENIPUNCTURE: CPT

## 2022-03-23 ENCOUNTER — OFFICE VISIT (OUTPATIENT)
Dept: GASTROENTEROLOGY | Facility: CLINIC | Age: 47
End: 2022-03-23

## 2022-03-23 VITALS
BODY MASS INDEX: 35.77 KG/M2 | HEART RATE: 76 BPM | SYSTOLIC BLOOD PRESSURE: 112 MMHG | HEIGHT: 68 IN | WEIGHT: 236 LBS | DIASTOLIC BLOOD PRESSURE: 67 MMHG

## 2022-03-23 DIAGNOSIS — R16.0 HEPATOMEGALY: ICD-10-CM

## 2022-03-23 DIAGNOSIS — R74.8 ELEVATED LIVER ENZYMES: Primary | ICD-10-CM

## 2022-03-23 PROCEDURE — 99214 OFFICE O/P EST MOD 30 MIN: CPT | Performed by: NURSE PRACTITIONER

## 2022-03-23 NOTE — PROGRESS NOTES
Patient Name: Deniz Calderon   Visit Date: 03/23/2022   Patient ID: [id number]  Provider: SERAFIN Zarate    Sex: male  Location:  Location Address:  Location Phone: [address] [place] [address] [phone]    YOB: 1975      Primary Care Provider Lin Blevins APRN      Referring Provider: SERAFIN Dugan        Chief Complaint  Elevated Hepatic Enzymes    History of Present Illness  Deniz Calderon is a 47 y.o. who presents to [facility] on referral from SERAFIN Dugan for a gastroenterology evaluation of Elevated Hepatic Enzymes.    Mr. Calderon reports the end of Feburary he became sick after eating a salad. Woke up in the middle of the night with vomiting and a intense headache. The headache lasted for approx a week. Began to notice night sweats shortly after as well. PCP order labs that were consistent with EBV. During this time liver enzymes were also elevated and CT showed hepatomegaly.     Drinks a few alcoholic drinks every 1-2 months. Does not drink often. Denies any unprofessional tattoos, hx of illicit drug use, or hx of blood transfusion.    Denies any abdominal pain, ascites, nausea, vomiting, or dysphagia. Appetite and weight stable.     Bowel movement at least once daily, sometimes multiple times a day.  Denies any hematochezia or melena.    Labs Result Review Imaging    Past Medical History:   Diagnosis Date   • Allergic    • GERD (gastroesophageal reflux disease)    • Headache    • Hepatosplenomegaly 3/7/2022   • Hyperlipidemia    • Hypertension    • Hypothyroidism    • Obesity        History reviewed. No pertinent surgical history.      Current Outpatient Medications:   •  cholecalciferol (VITAMIN D3) 25 MCG (1000 UT) tablet, Take 1,000 Units by mouth Daily., Disp: , Rfl:   •  levothyroxine (SYNTHROID, LEVOTHROID) 175 MCG tablet, Take 175 mcg by mouth Daily., Disp: , Rfl:   •  multivitamin with minerals tablet tablet,  "Take 1 tablet by mouth Daily., Disp: , Rfl:   •  vitamin B-12 (CYANOCOBALAMIN) 100 MCG tablet, Take 50 mcg by mouth Every Other Day., Disp: , Rfl:      Allergies   Allergen Reactions   • Penicillins Unknown - High Severity     Mild to severe reaction as a baby       Family History   Problem Relation Age of Onset   • Parkinsonism Mother    • Heart disease Father    • Stroke Father    • Hypertension Father    • Hyperlipidemia Father    • Atrial fibrillation Father    • No Known Problems Sister    • No Known Problems Daughter    • Parkinsonism Maternal Uncle    • No Known Problems Maternal Grandmother    • Thyroid disease Maternal Grandfather    • Heart disease Paternal Grandmother    • Heart disease Paternal Grandfather         Social History     Social History Narrative   • Not on file         Objective     Review of Systems   Constitutional: Negative for appetite change, fever and unexpected weight loss.   Gastrointestinal: Negative for abdominal distention and abdominal pain.        Vital Signs:   /67 (BP Location: Left arm, Patient Position: Sitting, Cuff Size: Large Adult)   Pulse 76   Ht 172.7 cm (68\")   Wt 107 kg (236 lb)   BMI 35.88 kg/m²       Physical Exam  Constitutional:       General: He is not in acute distress.     Appearance: Normal appearance. He is well-developed and normal weight.   HENT:      Head: Normocephalic and atraumatic.   Eyes:      Conjunctiva/sclera: Conjunctivae normal.      Pupils: Pupils are equal, round, and reactive to light.      Visual Fields: Right eye visual fields normal and left eye visual fields normal.   Cardiovascular:      Rate and Rhythm: Normal rate and regular rhythm.      Heart sounds: Normal heart sounds.   Pulmonary:      Effort: Pulmonary effort is normal. No retractions.      Breath sounds: Normal breath sounds and air entry.   Abdominal:      General: Bowel sounds are normal. There is no distension.      Palpations: Abdomen is soft.      Tenderness: There " is no abdominal tenderness.      Comments: No appreciable hepatosplenomegaly or ascites   Musculoskeletal:         General: Normal range of motion.      Cervical back: Normal range of motion and neck supple.   Skin:     General: Skin is warm and dry.   Neurological:      Mental Status: He is alert and oriented to person, place, and time.   Psychiatric:         Mood and Affect: Mood and affect normal.         Behavior: Behavior normal.         Result Review :   The following data was reviewed by: SERAFIN Zarate on 03/23/2022:  CBC w/diff    CBC w/Diff 2/28/22 3/7/22 3/17/22   WBC 16.00 (A) 8.50 5.84   RBC 5.34 5.17 4.84   Hemoglobin 16.2 15.5 14.3   Hematocrit 47.0 46.1 43.7   MCV 88.0 89.2 90.3   MCH 30.3 30.0 29.5   MCHC 34.5 33.6 32.7   RDW 12.9 12.9 13.0   Platelets 250 389 262   Neutrophil Rel %   45.7   Immature Granulocyte Rel %   0.2   Lymphocyte Rel %   43.8   Monocyte Rel %   7.4   Eosinophil Rel %   1.7   Basophil Rel %   1.2   (A) Abnormal value            CMP    CMP 3/2/22 3/7/22 3/17/22   Glucose 92 75 77   BUN 16 18 21 (A)   Creatinine 1.13 1.02 1.06   Sodium 140 140 139   Potassium 4.3 4.9 4.5   Chloride 102 105 105   Calcium 9.7 9.6 9.6   Albumin 4.20 4.30 4.40   Total Bilirubin 0.4 0.3 0.4   Alkaline Phosphatase 417 (A) 226 (A) 121 (A)   AST (SGOT) 74 (A) 29 29   ALT (SGPT) 195 (A) 67 (A) 42 (A)   (A) Abnormal value            Lipase   Date Value Ref Range Status   02/28/2022 73 (H) 13 - 60 U/L Final     Amylase   Date Value Ref Range Status   02/28/2022 92 28 - 100 U/L Final     Iron   Date Value Ref Range Status   03/02/2022 148 59 - 158 mcg/dL Final     TIBC   Date Value Ref Range Status   03/02/2022 384 298 - 536 mcg/dL Final     Iron Saturation   Date Value Ref Range Status   03/02/2022 38 20 - 50 % Final     Transferrin   Date Value Ref Range Status   03/02/2022 258 200 - 360 mg/dL Final     Ferritin   Date Value Ref Range Status   03/02/2022 496.40 (H) 30.00 - 400.00 ng/mL Final      dsDNA   Date Value Ref Range Status   03/02/2022 Negative Negative Final     Expanded FANNY Screen   Date Value Ref Range Status   03/02/2022 Negative Negative Final     Ceruloplasmin   Date Value Ref Range Status   03/02/2022 38 (H) 16 - 31 mg/dL Final     Mitochondrial Ab   Date Value Ref Range Status   03/02/2022 <20.0 0.0 - 20.0 Units Final     Comment:                                     Negative    0.0 - 20.0                                  Equivocal  20.1 - 24.9                                  Positive         >24.9  Mitochondrial (M2) Antibodies are found in 90-96% of  patients with primary biliary cirrhosis.     Hepatitis B Surface Ag   Date Value Ref Range Status   03/02/2022 Non-Reactive Non-Reactive Final     Hep A IgM   Date Value Ref Range Status   03/02/2022 Non-Reactive Non-Reactive Final     Hep B C IgM   Date Value Ref Range Status   03/02/2022 Non-Reactive Non-Reactive Final     Hepatitis C Ab   Date Value Ref Range Status   03/02/2022 Non-Reactive Non-Reactive Final     Protime   Date Value Ref Range Status   03/17/2022 11.0 9.4 - 12.0 Seconds Final     INR   Date Value Ref Range Status   03/17/2022 1.06 (L) 2.00 - 3.00 Final        CT of the abdomen and pelvis with contrast 3/1/2022: Hepatosplenomegaly.     Assessment and Plan    Diagnoses and all orders for this visit:    1. Elevated liver enzymes (Primary)  -     Iron Profile; Future  -     Ferritin; Future  -     SHELBI; Future  -     Mitochondrial Antibodies, M2; Future  -     Anti-Smooth Muscle Antibody Titer; Future  -     Immunofixation, Serum; Future  -     Alpha - 1 - Antitrypsin; Future  -     Protime-INR; Future  -     Hepatitis Panel, Acute; Future  -     VILLANUEVA Fibrosure; Future  -     Comprehensive Metabolic Panel; Future  -     CBC & Differential; Future    2. Hepatomegaly  -     Iron Profile; Future  -     Ferritin; Future  -     SHELBI; Future  -     Mitochondrial Antibodies, M2; Future  -     Anti-Smooth Muscle Antibody Titer;  Future  -     Immunofixation, Serum; Future  -     Alpha - 1 - Antitrypsin; Future  -     Protime-INR; Future  -     Hepatitis Panel, Acute; Future  -     VILLANUEVA Fibrosure; Future  -     Comprehensive Metabolic Panel; Future  -     CBC & Differential; Future      * Surgery not found *     Truly believe patient's liver enzymes were elevated due to mono however will further evaluate with liver work-up to make sure no underlying autoimmune or infectious etiology of the liver.  Encouraged patient not to get labs until 3 to 4 weeks from now to give the liver time to heal.     Follow Up   Return in about 3 months (around 6/23/2022).  Patient was given instructions and counseling regarding his condition or for health maintenance advice. Please see specific information pulled into the AVS if appropriate.

## 2022-04-06 ENCOUNTER — LAB (OUTPATIENT)
Dept: LAB | Facility: HOSPITAL | Age: 47
End: 2022-04-06

## 2022-04-06 DIAGNOSIS — R16.0 HEPATOMEGALY: ICD-10-CM

## 2022-04-06 DIAGNOSIS — R74.8 ELEVATED LIVER ENZYMES: ICD-10-CM

## 2022-04-06 LAB
ALBUMIN SERPL-MCNC: 4.3 G/DL (ref 3.5–5.2)
ALBUMIN/GLOB SERPL: 1.5 G/DL
ALP SERPL-CCNC: 82 U/L (ref 39–117)
ALPHA1 GLOB MFR UR ELPH: 134 MG/DL (ref 90–200)
ALT SERPL W P-5'-P-CCNC: 35 U/L (ref 1–41)
ANION GAP SERPL CALCULATED.3IONS-SCNC: 8.7 MMOL/L (ref 5–15)
AST SERPL-CCNC: 29 U/L (ref 1–40)
BASOPHILS # BLD AUTO: 0.06 10*3/MM3 (ref 0–0.2)
BASOPHILS NFR BLD AUTO: 1.2 % (ref 0–1.5)
BILIRUB SERPL-MCNC: 0.5 MG/DL (ref 0–1.2)
BUN SERPL-MCNC: 20 MG/DL (ref 6–20)
BUN/CREAT SERPL: 18 (ref 7–25)
CALCIUM SPEC-SCNC: 9.4 MG/DL (ref 8.6–10.5)
CHLORIDE SERPL-SCNC: 107 MMOL/L (ref 98–107)
CO2 SERPL-SCNC: 22.3 MMOL/L (ref 22–29)
CREAT SERPL-MCNC: 1.11 MG/DL (ref 0.76–1.27)
DEPRECATED RDW RBC AUTO: 41 FL (ref 37–54)
EGFRCR SERPLBLD CKD-EPI 2021: 82.9 ML/MIN/1.73
EOSINOPHIL # BLD AUTO: 0.14 10*3/MM3 (ref 0–0.4)
EOSINOPHIL NFR BLD AUTO: 2.7 % (ref 0.3–6.2)
ERYTHROCYTE [DISTWIDTH] IN BLOOD BY AUTOMATED COUNT: 12.8 % (ref 12.3–15.4)
FERRITIN SERPL-MCNC: 94.5 NG/ML (ref 30–400)
GLOBULIN UR ELPH-MCNC: 2.8 GM/DL
GLUCOSE SERPL-MCNC: 78 MG/DL (ref 65–99)
HAV IGM SERPL QL IA: NORMAL
HBV CORE IGM SERPL QL IA: NORMAL
HBV SURFACE AG SERPL QL IA: NORMAL
HCT VFR BLD AUTO: 43.3 % (ref 37.5–51)
HCV AB SER DONR QL: NORMAL
HGB BLD-MCNC: 14.9 G/DL (ref 13–17.7)
IMM GRANULOCYTES # BLD AUTO: 0.01 10*3/MM3 (ref 0–0.05)
IMM GRANULOCYTES NFR BLD AUTO: 0.2 % (ref 0–0.5)
INR PPP: 1.04 (ref 2–3)
IRON 24H UR-MRATE: 144 MCG/DL (ref 59–158)
IRON SATN MFR SERPL: 40 % (ref 20–50)
LYMPHOCYTES # BLD AUTO: 2.15 10*3/MM3 (ref 0.7–3.1)
LYMPHOCYTES NFR BLD AUTO: 41.7 % (ref 19.6–45.3)
MCH RBC QN AUTO: 30.3 PG (ref 26.6–33)
MCHC RBC AUTO-ENTMCNC: 34.4 G/DL (ref 31.5–35.7)
MCV RBC AUTO: 88 FL (ref 79–97)
MONOCYTES # BLD AUTO: 0.4 10*3/MM3 (ref 0.1–0.9)
MONOCYTES NFR BLD AUTO: 7.8 % (ref 5–12)
NEUTROPHILS NFR BLD AUTO: 2.39 10*3/MM3 (ref 1.7–7)
NEUTROPHILS NFR BLD AUTO: 46.4 % (ref 42.7–76)
NRBC BLD AUTO-RTO: 0 /100 WBC (ref 0–0.2)
PLATELET # BLD AUTO: 246 10*3/MM3 (ref 140–450)
PMV BLD AUTO: 9.3 FL (ref 6–12)
POTASSIUM SERPL-SCNC: 4.4 MMOL/L (ref 3.5–5.2)
PROT SERPL-MCNC: 7.1 G/DL (ref 6–8.5)
PROTHROMBIN TIME: 10.8 SECONDS (ref 9.4–12)
RBC # BLD AUTO: 4.92 10*6/MM3 (ref 4.14–5.8)
SODIUM SERPL-SCNC: 138 MMOL/L (ref 136–145)
TIBC SERPL-MCNC: 362 MCG/DL (ref 298–536)
TRANSFERRIN SERPL-MCNC: 243 MG/DL (ref 200–360)
WBC NRBC COR # BLD: 5.15 10*3/MM3 (ref 3.4–10.8)

## 2022-04-06 PROCEDURE — 83010 ASSAY OF HAPTOGLOBIN QUANT: CPT

## 2022-04-06 PROCEDURE — 82465 ASSAY BLD/SERUM CHOLESTEROL: CPT

## 2022-04-06 PROCEDURE — 82784 ASSAY IGA/IGD/IGG/IGM EACH: CPT

## 2022-04-06 PROCEDURE — 82172 ASSAY OF APOLIPOPROTEIN: CPT

## 2022-04-06 PROCEDURE — 82977 ASSAY OF GGT: CPT

## 2022-04-06 PROCEDURE — 86225 DNA ANTIBODY NATIVE: CPT

## 2022-04-06 PROCEDURE — 86015 ACTIN ANTIBODY EACH: CPT

## 2022-04-06 PROCEDURE — 80074 ACUTE HEPATITIS PANEL: CPT

## 2022-04-06 PROCEDURE — 84466 ASSAY OF TRANSFERRIN: CPT

## 2022-04-06 PROCEDURE — 83540 ASSAY OF IRON: CPT

## 2022-04-06 PROCEDURE — 82103 ALPHA-1-ANTITRYPSIN TOTAL: CPT

## 2022-04-06 PROCEDURE — 85025 COMPLETE CBC W/AUTO DIFF WBC: CPT

## 2022-04-06 PROCEDURE — 85610 PROTHROMBIN TIME: CPT

## 2022-04-06 PROCEDURE — 83883 ASSAY NEPHELOMETRY NOT SPEC: CPT

## 2022-04-06 PROCEDURE — 80053 COMPREHEN METABOLIC PANEL: CPT

## 2022-04-06 PROCEDURE — 86334 IMMUNOFIX E-PHORESIS SERUM: CPT

## 2022-04-06 PROCEDURE — 82728 ASSAY OF FERRITIN: CPT

## 2022-04-06 PROCEDURE — 86038 ANTINUCLEAR ANTIBODIES: CPT

## 2022-04-06 PROCEDURE — 86381 MITOCHONDRIAL ANTIBODY EACH: CPT

## 2022-04-06 PROCEDURE — 84478 ASSAY OF TRIGLYCERIDES: CPT

## 2022-04-06 PROCEDURE — 36415 COLL VENOUS BLD VENIPUNCTURE: CPT

## 2022-04-07 LAB
DSDNA IGG SERPL IA-ACNC: NEGATIVE [IU]/ML
IGA SERPL-MCNC: 256 MG/DL (ref 90–386)
IGG SERPL-MCNC: 1167 MG/DL (ref 603–1613)
IGM SERPL-MCNC: 88 MG/DL (ref 20–172)
MITOCHONDRIA M2 IGG SER-ACNC: <20 UNITS (ref 0–20)
NUCLEAR IGG SER IA-RTO: NEGATIVE
PROT PATTERN SERPL IFE-IMP: NORMAL
SMA IGG SER-ACNC: 5 UNITS (ref 0–19)

## 2022-04-08 LAB
A2 MACROGLOB SERPL-MCNC: 151 MG/DL (ref 110–276)
ALT SERPL W P-5'-P-CCNC: 44 IU/L (ref 0–55)
APO A-I SERPL-MCNC: 121 MG/DL (ref 101–178)
AST SERPL W P-5'-P-CCNC: 35 IU/L (ref 0–40)
BILIRUB SERPL-MCNC: 0.4 MG/DL (ref 0–1.2)
CHOLEST SERPL-MCNC: 193 MG/DL (ref 100–199)
FIBROSIS SCORING:: ABNORMAL
FIBROSIS STAGE SERPL QL: ABNORMAL
GGT SERPL-CCNC: 23 IU/L (ref 0–65)
GLUCOSE SERPL-MCNC: 83 MG/DL (ref 65–99)
HAPTOGLOB SERPL-MCNC: 96 MG/DL (ref 23–355)
INTERPRETATIONS: (REFERENCE): ABNORMAL
LABORATORY COMMENT REPORT: ABNORMAL
LIVER FIBR SCORE SERPL CALC.FIBROSURE: 0.15 (ref 0–0.21)
NASH SCORING (REFERENCE): ABNORMAL
NECROINFLAMMATORY ACT GRADE SERPL QL: ABNORMAL
NECROINFLAMMATORY ACT SCORE SERPL: 0.5
SERVICE CMNT-IMP: ABNORMAL
STEATOSIS GRADE (REFERENCE): ABNORMAL
STEATOSIS GRADING (REFERENCE): ABNORMAL
STEATOSIS SCORE (REFERENCE): 0.55 (ref 0–0.3)
TRIGL SERPL-MCNC: 113 MG/DL (ref 0–149)

## 2022-06-06 ENCOUNTER — OFFICE VISIT (OUTPATIENT)
Dept: GASTROENTEROLOGY | Facility: CLINIC | Age: 47
End: 2022-06-06

## 2022-06-06 VITALS
BODY MASS INDEX: 34.1 KG/M2 | HEART RATE: 64 BPM | HEIGHT: 68 IN | DIASTOLIC BLOOD PRESSURE: 63 MMHG | SYSTOLIC BLOOD PRESSURE: 106 MMHG | WEIGHT: 225 LBS

## 2022-06-06 DIAGNOSIS — K75.81 NASH (NONALCOHOLIC STEATOHEPATITIS): ICD-10-CM

## 2022-06-06 DIAGNOSIS — K76.0 FATTY LIVER: Primary | ICD-10-CM

## 2022-06-06 PROCEDURE — 99212 OFFICE O/P EST SF 10 MIN: CPT | Performed by: NURSE PRACTITIONER

## 2022-06-06 NOTE — PROGRESS NOTES
"  Chief Complaint  Follow-up (Elevated LFT's, VILLANUEVA )    History of Present Illness  Deniz Calderon is a 47 y.o. who presents to [facility] for follow up of Follow-up (Elevated LFT's, VILLANUEVA ).    Mr. Calderon presents today for follow-up elevated liver enzymes.  Liver work-up was negative for any infectious or autoimmune etiology.  Patient did have mild fatty liver with no fibrosis noted on FibroSure. He has lost 45lbs in the last 2 years and 11lb ayana loss since last visit as well. He is working hard at losing ayana by counting calories.  Has no complaints.  Denies any abdominal pain, ascites, lower extremity swelling, nausea, vomiting, change in appetite, or unexplained weight loss.    Patient also recently had a Cologuard that was negative.    Labs Result Review Imaging    Past Medical History:   Diagnosis Date   • Allergic    • GERD (gastroesophageal reflux disease)    • Headache    • Hepatosplenomegaly 3/7/2022   • Hyperlipidemia    • Hypertension    • Hypothyroidism    • Obesity        Past Surgical History:   Procedure Laterality Date   • COLONOSCOPY         Current Outpatient Medications on File Prior to Visit   Medication Sig Dispense Refill   • cholecalciferol (VITAMIN D3) 25 MCG (1000 UT) tablet Take 1,000 Units by mouth Daily.     • levothyroxine (SYNTHROID, LEVOTHROID) 175 MCG tablet Take 175 mcg by mouth Daily.     • multivitamin with minerals tablet tablet Take 1 tablet by mouth Daily.     • vitamin B-12 (CYANOCOBALAMIN) 100 MCG tablet Take 50 mcg by mouth Every Other Day.       No current facility-administered medications on file prior to visit.       Social History     Social History Narrative   • Not on file         Objective     Review of Systems   Gastrointestinal: Negative for abdominal pain, nausea and vomiting.        Vital Signs:   /63   Pulse 64   Ht 172.7 cm (68\")   Wt 102 kg (225 lb)   BMI 34.21 kg/m²       Physical Exam  Constitutional:       " General: He is not in acute distress.     Appearance: Normal appearance. He is well-developed and normal weight.   HENT:      Head: Normocephalic and atraumatic.   Eyes:      Conjunctiva/sclera: Conjunctivae normal.      Pupils: Pupils are equal, round, and reactive to light.      Visual Fields: Right eye visual fields normal and left eye visual fields normal.   Cardiovascular:      Rate and Rhythm: Normal rate and regular rhythm.      Heart sounds: Normal heart sounds.   Pulmonary:      Effort: Pulmonary effort is normal. No retractions.      Breath sounds: Normal breath sounds and air entry.   Abdominal:      General: Bowel sounds are normal. There is no distension.      Palpations: Abdomen is soft.      Tenderness: There is no abdominal tenderness.      Comments: No appreciable hepatosplenomegaly or ascites   Musculoskeletal:         General: Normal range of motion.      Cervical back: Normal range of motion and neck supple.   Skin:     General: Skin is warm and dry.   Neurological:      Mental Status: He is alert and oriented to person, place, and time.   Psychiatric:         Mood and Affect: Mood and affect normal.         Behavior: Behavior normal.         Result Review :   The following data was reviewed by: SERAFIN Zarate on 06/06/2022:  CBC w/diff    CBC w/Diff 3/7/22 3/17/22 4/6/22   WBC 8.50 5.84 5.15   RBC 5.17 4.84 4.92   Hemoglobin 15.5 14.3 14.9   Hematocrit 46.1 43.7 43.3   MCV 89.2 90.3 88.0   MCH 30.0 29.5 30.3   MCHC 33.6 32.7 34.4   RDW 12.9 13.0 12.8   Platelets 389 262 246   Neutrophil Rel %  45.7 46.4   Immature Granulocyte Rel %  0.2 0.2   Lymphocyte Rel %  43.8 41.7   Monocyte Rel %  7.4 7.8   Eosinophil Rel %  1.7 2.7   Basophil Rel %  1.2 1.2           CMP    CMP 3/7/22 3/17/22 4/6/22   Glucose 75 77 78   BUN 18 21 (A) 20   Creatinine 1.02 1.06 1.11   Sodium 140 139 138   Potassium 4.9 4.5 4.4   Chloride 105 105 107   Calcium 9.6 9.6 9.4   Albumin 4.30 4.40 4.30   Total Bilirubin  0.3 0.4 0.5   Alkaline Phosphatase 226 (A) 121 (A) 82   AST (SGOT) 29 29 29   ALT (SGPT) 67 (A) 42 (A) 35   (A) Abnormal value            Lipase   Date Value Ref Range Status   02/28/2022 73 (H) 13 - 60 U/L Final     Amylase   Date Value Ref Range Status   02/28/2022 92 28 - 100 U/L Final     Iron   Date Value Ref Range Status   04/06/2022 144 59 - 158 mcg/dL Final     TIBC   Date Value Ref Range Status   04/06/2022 362 298 - 536 mcg/dL Final     Iron Saturation   Date Value Ref Range Status   04/06/2022 40 20 - 50 % Final     Transferrin   Date Value Ref Range Status   04/06/2022 243 200 - 360 mg/dL Final     Ferritin   Date Value Ref Range Status   04/06/2022 94.50 30.00 - 400.00 ng/mL Final     ALPHA -1 ANTITRYPSIN   Date Value Ref Range Status   04/06/2022 134 90 - 200 mg/dL Final     dsDNA   Date Value Ref Range Status   04/06/2022 Negative Negative Final     Expanded FANNY Screen   Date Value Ref Range Status   04/06/2022 Negative Negative Final     Smooth Muscle Ab   Date Value Ref Range Status   04/06/2022 5 0 - 19 Units Final     Comment:                      Negative                     0 - 19                   Weak positive               20 - 30                   Moderate to strong positive     >30   Actin Antibodies are found in 52-85% of patients with   autoimmune hepatitis or chronic active hepatitis and   in 22% of patients with primary biliary cirrhosis.     Ceruloplasmin   Date Value Ref Range Status   03/02/2022 38 (H) 16 - 31 mg/dL Final     Immunofixation Result, Serum   Date Value Ref Range Status   04/06/2022 Comment  Final     Comment:     No monoclonality detected.     IgG   Date Value Ref Range Status   04/06/2022 1167 603 - 1613 mg/dL Final     IgA   Date Value Ref Range Status   04/06/2022 256 90 - 386 mg/dL Final     IgM   Date Value Ref Range Status   04/06/2022 88 20 - 172 mg/dL Final     Mitochondrial Ab   Date Value Ref Range Status   04/06/2022 <20.0 0.0 - 20.0 Units Final     Comment:                                      Negative    0.0 - 20.0                                  Equivocal  20.1 - 24.9                                  Positive         >24.9  Mitochondrial (M2) Antibodies are found in 90-96% of  patients with primary biliary cirrhosis.     Hepatitis B Surface Ag   Date Value Ref Range Status   04/06/2022 Non-Reactive Non-Reactive Final     Hep A IgM   Date Value Ref Range Status   04/06/2022 Non-Reactive Non-Reactive Final     Hep B C IgM   Date Value Ref Range Status   04/06/2022 Non-Reactive Non-Reactive Final     Hepatitis C Ab   Date Value Ref Range Status   04/06/2022 Non-Reactive Non-Reactive Final     Protime   Date Value Ref Range Status   04/06/2022 10.8 9.4 - 12.0 Seconds Final     INR   Date Value Ref Range Status   04/06/2022 1.04 (L) 2.00 - 3.00 Final             Assessment and Plan    Diagnoses and all orders for this visit:    1. Fatty liver (Primary)    2. VILLANUEVA (nonalcoholic steatohepatitis)      * Surgery not found *       Follow Up   Return if symptoms worsen or fail to improve.  Patient was given instructions and counseling regarding his condition or for health maintenance advice. Please see specific information pulled into the AVS if appropriate.

## 2022-09-09 ENCOUNTER — OFFICE VISIT (OUTPATIENT)
Dept: INTERNAL MEDICINE | Facility: CLINIC | Age: 47
End: 2022-09-09

## 2022-09-09 VITALS
HEIGHT: 68 IN | SYSTOLIC BLOOD PRESSURE: 112 MMHG | WEIGHT: 197.2 LBS | BODY MASS INDEX: 29.89 KG/M2 | RESPIRATION RATE: 18 BRPM | TEMPERATURE: 97.3 F | DIASTOLIC BLOOD PRESSURE: 64 MMHG | HEART RATE: 65 BPM | OXYGEN SATURATION: 97 %

## 2022-09-09 DIAGNOSIS — I73.00 RAYNAUD'S PHENOMENON WITHOUT GANGRENE: ICD-10-CM

## 2022-09-09 DIAGNOSIS — K75.81 NASH (NONALCOHOLIC STEATOHEPATITIS): Primary | ICD-10-CM

## 2022-09-09 DIAGNOSIS — E78.2 MIXED HYPERLIPIDEMIA: ICD-10-CM

## 2022-09-09 DIAGNOSIS — E89.0 POSTABLATIVE HYPOTHYROIDISM: ICD-10-CM

## 2022-09-09 PROCEDURE — 99213 OFFICE O/P EST LOW 20 MIN: CPT

## 2022-09-09 NOTE — PROGRESS NOTES
"Chief Complaint  Follow-up (Pt is following up with Lin. )    SUBJECTIVE  Deniz Calderon presents to [facility] follow up.  Doing well overall. Pt has lost almost 40 pounds since March through diet and exercise.  Patient is doing well overall.  Patient reports possible Raynaud's phenomenon    History of Present Illness  Past Medical History:   Diagnosis Date   • Allergic    • GERD (gastroesophageal reflux disease)    • Headache    • Hepatosplenomegaly 3/7/2022   • Hyperlipidemia    • Hypertension    • Hypothyroidism    • VILLANUEVA (nonalcoholic steatohepatitis) 9/9/2022   • Obesity       Family History   Problem Relation Age of Onset   • Parkinsonism Mother    • Heart disease Father    • Stroke Father    • Hypertension Father    • Hyperlipidemia Father    • Atrial fibrillation Father    • No Known Problems Sister    • No Known Problems Daughter    • Parkinsonism Maternal Uncle    • No Known Problems Maternal Grandmother    • Thyroid disease Maternal Grandfather    • Heart disease Paternal Grandmother    • Heart disease Paternal Grandfather       Past Surgical History:   Procedure Laterality Date   • COLONOSCOPY          Current Outpatient Medications:   •  cholecalciferol (VITAMIN D3) 25 MCG (1000 UT) tablet, Take 1,000 Units by mouth Daily., Disp: , Rfl:   •  levothyroxine (SYNTHROID, LEVOTHROID) 175 MCG tablet, Take 175 mcg by mouth Daily., Disp: , Rfl:   •  multivitamin with minerals tablet tablet, Take 1 tablet by mouth Daily., Disp: , Rfl:   •  vitamin B-12 (CYANOCOBALAMIN) 100 MCG tablet, Take 50 mcg by mouth Every Other Day., Disp: , Rfl:     OBJECTIVE  Vital Signs:   /64 (BP Location: Left arm)   Pulse 65   Temp 97.3 °F (36.3 °C) (Temporal)   Resp 18   Ht 172.7 cm (68\")   Wt 89.4 kg (197 lb 3.2 oz)   SpO2 97%   BMI 29.98 kg/m²    Estimated body mass index is 29.98 kg/m² as calculated from the following:    Height as of this encounter: 172.7 cm (68\").    Weight as " of this encounter: 89.4 kg (197 lb 3.2 oz).     Wt Readings from Last 3 Encounters:   09/09/22 89.4 kg (197 lb 3.2 oz)   06/06/22 102 kg (225 lb)   03/23/22 107 kg (236 lb)     BP Readings from Last 3 Encounters:   09/09/22 112/64   06/06/22 106/63   03/23/22 112/67       Physical Exam  Vitals and nursing note reviewed.   Constitutional:       Appearance: Normal appearance.   HENT:      Head: Normocephalic.   Eyes:      Extraocular Movements: Extraocular movements intact.      Conjunctiva/sclera: Conjunctivae normal.   Cardiovascular:      Rate and Rhythm: Normal rate and regular rhythm.      Heart sounds: Normal heart sounds. No murmur heard.  Pulmonary:      Effort: Pulmonary effort is normal.      Breath sounds: Normal breath sounds. No wheezing or rales.   Abdominal:      General: Bowel sounds are normal.      Palpations: Abdomen is soft.      Tenderness: There is no abdominal tenderness. There is no guarding.   Musculoskeletal:         General: No swelling. Normal range of motion.   Skin:     General: Skin is warm and dry.   Neurological:      General: No focal deficit present.      Mental Status: He is alert. Mental status is at baseline.   Psychiatric:         Mood and Affect: Mood normal.         Thought Content: Thought content normal.          Result Review      No Images in the past 120 days found..     The above data has been reviewed by SERAFIN Dugan 09/09/2022 17:02 EDT.          Patient Care Team:  Lin Blevins APRN as PCP - General (Internal Medicine)    BMI is >= 25 and <30. (Overweight) The following options were offered after discussion;: exercise counseling/recommendations and nutrition counseling/recommendations.  This is improving.  Patient has lost 40 pounds in the last 6 months.  By diet and exercise.       ASSESSMENT & PLAN    Diagnoses and all orders for this visit:    1. VILLANUEVA (nonalcoholic steatohepatitis) (Primary)  Assessment & Plan:  Patient has a history of elevated LFTs.  He was  diagnosed with Callahan.  Last labs done in April showed normal LFTs.  Patient was seen by gastro and released.  Patient has lost about 40 pounds in the last 6 months.  Plan: Continue with lifestyle modifications, avoiding alcohol.  We will check LFTs.      Orders:  -     CBC & Differential; Future  -     Comprehensive Metabolic Panel; Future    2. Mixed hyperlipidemia  -     Lipid Panel; Future    3. Postablative hypothyroidism  Assessment & Plan:  Patient is status post radioactive iodine thyroid ablation.  He is followed by endocrinology.  He is on Synthroid 175 mcg daily.  He has labs done every 6 months with endocrinology.  Plan: Continue current treatment plan and medication      4. Raynaud's phenomenon without gangrene  Assessment & Plan:  Education material provided through AVS.         Tobacco Use: Medium Risk   • Smoking Tobacco Use: Former Smoker   • Smokeless Tobacco Use: Never Used       Follow Up     Return in about 6 months (around 3/9/2023) for Annual physical.      Patient was given instructions and counseling regarding his condition or for health maintenance advice. Please see specific information pulled into the AVS if appropriate.   I have reviewed information obtained and documented by others and I have confirmed the accuracy of this documented note.    SERAFIN Dugan

## 2022-09-09 NOTE — ASSESSMENT & PLAN NOTE
Patient has a history of elevated LFTs.  He was diagnosed with Callahan.  Last labs done in April showed normal LFTs.  Patient was seen by gastro and released.  Patient has lost about 40 pounds in the last 6 months.  Plan: Continue with lifestyle modifications, avoiding alcohol.  We will check LFTs.

## 2022-09-09 NOTE — ASSESSMENT & PLAN NOTE
Patient is status post radioactive iodine thyroid ablation.  He is followed by endocrinology.  He is on Synthroid 175 mcg daily.  He has labs done every 6 months with endocrinology.  Plan: Continue current treatment plan and medication

## 2022-09-23 ENCOUNTER — LAB (OUTPATIENT)
Dept: LAB | Facility: HOSPITAL | Age: 47
End: 2022-09-23

## 2022-09-23 DIAGNOSIS — E78.2 MIXED HYPERLIPIDEMIA: ICD-10-CM

## 2022-09-23 DIAGNOSIS — K75.81 NASH (NONALCOHOLIC STEATOHEPATITIS): ICD-10-CM

## 2022-09-23 LAB
ALBUMIN SERPL-MCNC: 4.6 G/DL (ref 3.5–5.2)
ALBUMIN/GLOB SERPL: 2.1 G/DL
ALP SERPL-CCNC: 66 U/L (ref 39–117)
ALT SERPL W P-5'-P-CCNC: 19 U/L (ref 1–41)
ANION GAP SERPL CALCULATED.3IONS-SCNC: 10.3 MMOL/L (ref 5–15)
AST SERPL-CCNC: 19 U/L (ref 1–40)
BASOPHILS # BLD AUTO: 0.03 10*3/MM3 (ref 0–0.2)
BASOPHILS NFR BLD AUTO: 0.7 % (ref 0–1.5)
BILIRUB SERPL-MCNC: 0.3 MG/DL (ref 0–1.2)
BUN SERPL-MCNC: 19 MG/DL (ref 6–20)
BUN/CREAT SERPL: 16.8 (ref 7–25)
CALCIUM SPEC-SCNC: 9.4 MG/DL (ref 8.6–10.5)
CHLORIDE SERPL-SCNC: 105 MMOL/L (ref 98–107)
CHOLEST SERPL-MCNC: 174 MG/DL (ref 0–200)
CO2 SERPL-SCNC: 27.7 MMOL/L (ref 22–29)
CREAT SERPL-MCNC: 1.13 MG/DL (ref 0.76–1.27)
DEPRECATED RDW RBC AUTO: 41.6 FL (ref 37–54)
EGFRCR SERPLBLD CKD-EPI 2021: 81.2 ML/MIN/1.73
EOSINOPHIL # BLD AUTO: 0.14 10*3/MM3 (ref 0–0.4)
EOSINOPHIL NFR BLD AUTO: 3.1 % (ref 0.3–6.2)
ERYTHROCYTE [DISTWIDTH] IN BLOOD BY AUTOMATED COUNT: 12.4 % (ref 12.3–15.4)
GLOBULIN UR ELPH-MCNC: 2.2 GM/DL
GLUCOSE SERPL-MCNC: 87 MG/DL (ref 65–99)
HCT VFR BLD AUTO: 39.1 % (ref 37.5–51)
HDLC SERPL-MCNC: 52 MG/DL (ref 40–60)
HGB BLD-MCNC: 13.2 G/DL (ref 13–17.7)
IMM GRANULOCYTES # BLD AUTO: 0 10*3/MM3 (ref 0–0.05)
IMM GRANULOCYTES NFR BLD AUTO: 0 % (ref 0–0.5)
LDLC SERPL CALC-MCNC: 112 MG/DL (ref 0–100)
LDLC/HDLC SERPL: 2.16 {RATIO}
LYMPHOCYTES # BLD AUTO: 1.83 10*3/MM3 (ref 0.7–3.1)
LYMPHOCYTES NFR BLD AUTO: 40.1 % (ref 19.6–45.3)
MCH RBC QN AUTO: 30.8 PG (ref 26.6–33)
MCHC RBC AUTO-ENTMCNC: 33.8 G/DL (ref 31.5–35.7)
MCV RBC AUTO: 91.4 FL (ref 79–97)
MONOCYTES # BLD AUTO: 0.26 10*3/MM3 (ref 0.1–0.9)
MONOCYTES NFR BLD AUTO: 5.7 % (ref 5–12)
NEUTROPHILS NFR BLD AUTO: 2.3 10*3/MM3 (ref 1.7–7)
NEUTROPHILS NFR BLD AUTO: 50.4 % (ref 42.7–76)
NRBC BLD AUTO-RTO: 0 /100 WBC (ref 0–0.2)
PLATELET # BLD AUTO: 265 10*3/MM3 (ref 140–450)
PMV BLD AUTO: 9.3 FL (ref 6–12)
POTASSIUM SERPL-SCNC: 4.6 MMOL/L (ref 3.5–5.2)
PROT SERPL-MCNC: 6.8 G/DL (ref 6–8.5)
RBC # BLD AUTO: 4.28 10*6/MM3 (ref 4.14–5.8)
SODIUM SERPL-SCNC: 143 MMOL/L (ref 136–145)
TRIGL SERPL-MCNC: 48 MG/DL (ref 0–150)
VLDLC SERPL-MCNC: 10 MG/DL (ref 5–40)
WBC NRBC COR # BLD: 4.56 10*3/MM3 (ref 3.4–10.8)

## 2022-09-23 PROCEDURE — 36415 COLL VENOUS BLD VENIPUNCTURE: CPT

## 2022-09-23 PROCEDURE — 80053 COMPREHEN METABOLIC PANEL: CPT

## 2022-09-23 PROCEDURE — 80061 LIPID PANEL: CPT

## 2022-09-23 PROCEDURE — 85025 COMPLETE CBC W/AUTO DIFF WBC: CPT

## 2023-03-21 ENCOUNTER — OFFICE VISIT (OUTPATIENT)
Dept: INTERNAL MEDICINE | Facility: CLINIC | Age: 48
End: 2023-03-21
Payer: COMMERCIAL

## 2023-03-21 VITALS
RESPIRATION RATE: 18 BRPM | WEIGHT: 193.4 LBS | HEART RATE: 66 BPM | HEIGHT: 68 IN | OXYGEN SATURATION: 98 % | DIASTOLIC BLOOD PRESSURE: 65 MMHG | TEMPERATURE: 98.2 F | SYSTOLIC BLOOD PRESSURE: 98 MMHG | BODY MASS INDEX: 29.31 KG/M2

## 2023-03-21 DIAGNOSIS — K75.81 NASH (NONALCOHOLIC STEATOHEPATITIS): ICD-10-CM

## 2023-03-21 DIAGNOSIS — Z00.00 ANNUAL PHYSICAL EXAM: Primary | ICD-10-CM

## 2023-03-21 DIAGNOSIS — R63.4 WEIGHT LOSS: ICD-10-CM

## 2023-03-21 DIAGNOSIS — E89.0 POSTABLATIVE HYPOTHYROIDISM: ICD-10-CM

## 2023-03-21 DIAGNOSIS — E55.9 VITAMIN D DEFICIENCY: ICD-10-CM

## 2023-03-21 DIAGNOSIS — R19.04 ABDOMINAL MASS, LEFT LOWER QUADRANT: ICD-10-CM

## 2023-03-21 DIAGNOSIS — Z92.3 S/P RADIOACTIVE IODINE THYROID ABLATION: ICD-10-CM

## 2023-03-21 DIAGNOSIS — M25.551 RIGHT HIP PAIN: ICD-10-CM

## 2023-03-21 DIAGNOSIS — Z13.220 SCREENING FOR LIPID DISORDERS: ICD-10-CM

## 2023-03-21 PROCEDURE — 99396 PREV VISIT EST AGE 40-64: CPT

## 2023-03-21 RX ORDER — LEVOTHYROXINE SODIUM 0.15 MG/1
150 TABLET ORAL DAILY
COMMUNITY
Start: 2022-11-14

## 2023-03-21 NOTE — PROGRESS NOTES
Chief Complaint  Annual Exam (Pt states that he is being seen for an annual physical. Pt states that his stomach is upset. He states that it does feel different. He states that he has hip pain at night that is manny in his hip. He states that it feels like it needs to be stretched. He states that this has progressively worseness over the course of 1 year. He states that he has a lump on his stomach that is egg shaped sometimes it is more noticeable more than normal but he denies any pain. )    History of Present Illness  SUBJECTIVE  Deniz Calderon presents to [facility] for his annual physical exam.     Patient states that he has had more of a mild sour stomach. Patient does chew a lot of gum. Denies constipation, diarrhea, fever chills. He has lost quite a bit of weight over the last year or so.    Patient does also complain of right hip pain at night. This is chronic. Patient did try the chiropractor and that was unsuccessful. This has gotten progressively worse over the last year.     He did get his flu vaccine in the fall.  He is very active and eats healthier.   Recommend regular dental/eye exams.    Patient states that he has noticed a lump in his left lower abdomen. He states it does not bother him but it is noticeable. He states he noticed it taking a shower one day.     Does not smoke or drink alcohol.    , 2 daughters    Past Medical History:   Diagnosis Date   • Allergic    • GERD (gastroesophageal reflux disease)    • Headache    • Hepatosplenomegaly 3/7/2022   • Hyperlipidemia    • Hypertension    • Hypothyroidism    • VILLANUEVA (nonalcoholic steatohepatitis) 9/9/2022   • Obesity       Family History   Problem Relation Age of Onset   • Parkinsonism Mother    • Heart disease Father    • Stroke Father    • Hypertension Father    • Hyperlipidemia Father    • Atrial fibrillation Father    • No Known Problems Sister    • No Known Problems Daughter    • Parkinsonism  "Maternal Uncle    • No Known Problems Maternal Grandmother    • Thyroid disease Maternal Grandfather    • Heart disease Paternal Grandmother    • Heart disease Paternal Grandfather       Past Surgical History:   Procedure Laterality Date   • COLONOSCOPY          Current Outpatient Medications:   •  levothyroxine (SYNTHROID, LEVOTHROID) 150 MCG tablet, Take 1 tablet by mouth Daily., Disp: , Rfl:   •  multivitamin with minerals tablet tablet, Take 1 tablet by mouth Daily., Disp: , Rfl:     OBJECTIVE  Vital Signs:   BP 98/65 (BP Location: Left arm)   Pulse 66   Temp 98.2 °F (36.8 °C) (Temporal)   Resp 18   Ht 172.7 cm (67.99\")   Wt 87.7 kg (193 lb 6.4 oz)   SpO2 98%   BMI 29.41 kg/m²    Estimated body mass index is 29.41 kg/m² as calculated from the following:    Height as of this encounter: 172.7 cm (67.99\").    Weight as of this encounter: 87.7 kg (193 lb 6.4 oz).     Wt Readings from Last 3 Encounters:   03/21/23 87.7 kg (193 lb 6.4 oz)   09/09/22 89.4 kg (197 lb 3.2 oz)   06/06/22 102 kg (225 lb)     BP Readings from Last 3 Encounters:   03/21/23 98/65   09/09/22 112/64   06/06/22 106/63       Physical Exam  Vitals and nursing note reviewed.   Constitutional:       Appearance: Normal appearance.   HENT:      Head: Normocephalic.   Eyes:      Extraocular Movements: Extraocular movements intact.      Conjunctiva/sclera: Conjunctivae normal.   Cardiovascular:      Rate and Rhythm: Normal rate and regular rhythm.      Heart sounds: Normal heart sounds. No murmur heard.  Pulmonary:      Effort: Pulmonary effort is normal.      Breath sounds: Normal breath sounds. No wheezing or rales.   Abdominal:      General: Bowel sounds are normal.      Palpations: Abdomen is soft.      Tenderness: There is no abdominal tenderness. There is no guarding.      Hernia: A hernia is present. Hernia is present in the left inguinal area.       Musculoskeletal:         General: No swelling. Normal range of motion.   Skin:     " General: Skin is warm and dry.   Neurological:      General: No focal deficit present.      Mental Status: He is alert. Mental status is at baseline.   Psychiatric:         Mood and Affect: Mood normal.         Thought Content: Thought content normal.          Result Review      No Images in the past 120 days found..     The above data has been reviewed by SERAFIN Dugan 03/21/2023 16:24 EDT.          Patient Care Team:  Lin Blevins APRN as PCP - General (Internal Medicine)      ASSESSMENT & PLAN    Diagnoses and all orders for this visit:    1. Annual physical exam (Primary)  Assessment & Plan:  Flu vaccine completed this season  Declines next covid booster.  Patient is very active. He has lost quite a bit of weight with lifestyle modifications.  Recommend regular dental/eye exams.  Colonoscopy-UTD.  Age related preventatives discussed with patient.    Orders:  -     CBC & Differential; Future  -     Comprehensive Metabolic Panel; Future  -     Urinalysis With Microscopic - Urine, Clean Catch; Future  -     TSH Rfx On Abnormal To Free T4; Future    2. VILLANUEVA (nonalcoholic steatohepatitis)  Assessment & Plan:  History of elevated FLT's with VILLANUEVA diagnosis following EBV infection. He was followed by gastro. LFTs were back down to normal on last set of labs.  He continues to lose weight.  We will monitor LFTs  Continue with lifestyle modifications and avoid alcohol    Orders:  -     CT Abdomen Pelvis With & Without Contrast; Future    3. Abdominal mass, left lower quadrant  Assessment & Plan:  This appears to be an inguinal hernia.  Patient has just noticed it while taking a shower 1 day.  He denies any pain or tenderness.  Patient has lost quite a bit of weight so it is very possible that its been there for a while.  Patient did used to lift heavy weights.    Orders:  -     CT Abdomen Pelvis With & Without Contrast; Future    4. Postablative hypothyroidism  Assessment & Plan:  Patient is status post radioactive  iodine thyroid ablation.  He is followed by endocrinology.  He is currently on Synthroid 150 mcg daily.  He was recently decreased to that due to low TSH.  He has not had any recent labs.  We will check labs.      5. Vitamin D deficiency  -     Vitamin D,25-Hydroxy; Future    6. Screening for lipid disorders  -     Lipid Panel; Future    7. Weight loss  -     Lipase; Future  -     Vitamin D,25-Hydroxy; Future  -     Vitamin B12 & Folate; Future    8. Right hip pain  -     XR Hip With or Without Pelvis 2 - 3 View Right; Future  -     MRI Hip Right Without Contrast; Future    9. S/P radioactive iodine thyroid ablation       Tobacco Use: Medium Risk   • Smoking Tobacco Use: Former   • Smokeless Tobacco Use: Never   • Passive Exposure: Not on file       Follow Up     Return in about 6 months (around 9/21/2023) for Recheck.    Please note that portions of this note were completed with a voice recognition program.    Patient was given instructions and counseling regarding his condition or for health maintenance advice. Please see specific information pulled into the AVS if appropriate.   I have reviewed information obtained and documented by others and I have confirmed the accuracy of this documented note.    SERAFIN Dugan

## 2023-03-21 NOTE — ASSESSMENT & PLAN NOTE
Flu vaccine completed this season  Declines next covid booster.  Patient is very active. He has lost quite a bit of weight with lifestyle modifications.  Recommend regular dental/eye exams.  Colonoscopy-UTD.  Age related preventatives discussed with patient.

## 2023-03-22 NOTE — ASSESSMENT & PLAN NOTE
History of elevated FLT's with VILLANUEVA diagnosis following EBV infection. He was followed by gastro. LFTs were back down to normal on last set of labs.  He continues to lose weight.  We will monitor LFTs  Continue with lifestyle modifications and avoid alcohol

## 2023-03-22 NOTE — ASSESSMENT & PLAN NOTE
This appears to be an inguinal hernia.  Patient has just noticed it while taking a shower 1 day.  He denies any pain or tenderness.  Patient has lost quite a bit of weight so it is very possible that its been there for a while.  Patient did used to lift heavy weights.

## 2023-03-22 NOTE — ASSESSMENT & PLAN NOTE
Patient is status post radioactive iodine thyroid ablation.  He is followed by endocrinology.  He is currently on Synthroid 150 mcg daily.  He was recently decreased to that due to low TSH.  He has not had any recent labs.  We will check labs.

## 2023-04-07 ENCOUNTER — LAB (OUTPATIENT)
Dept: LAB | Facility: HOSPITAL | Age: 48
End: 2023-04-07
Payer: COMMERCIAL

## 2023-04-07 ENCOUNTER — HOSPITAL ENCOUNTER (OUTPATIENT)
Dept: GENERAL RADIOLOGY | Facility: HOSPITAL | Age: 48
Discharge: HOME | End: 2023-04-07
Payer: COMMERCIAL

## 2023-04-07 DIAGNOSIS — R63.4 WEIGHT LOSS: ICD-10-CM

## 2023-04-07 DIAGNOSIS — K75.81 NASH (NONALCOHOLIC STEATOHEPATITIS): ICD-10-CM

## 2023-04-07 DIAGNOSIS — Z13.220 SCREENING FOR LIPID DISORDERS: ICD-10-CM

## 2023-04-07 DIAGNOSIS — E55.9 VITAMIN D DEFICIENCY: ICD-10-CM

## 2023-04-07 DIAGNOSIS — M25.551 RIGHT HIP PAIN: ICD-10-CM

## 2023-04-07 DIAGNOSIS — Z00.00 ANNUAL PHYSICAL EXAM: ICD-10-CM

## 2023-04-07 LAB
25(OH)D3 SERPL-MCNC: 41.6 NG/ML (ref 30–100)
ALBUMIN SERPL-MCNC: 4.7 G/DL (ref 3.5–5.2)
ALBUMIN/GLOB SERPL: 1.8 G/DL
ALP SERPL-CCNC: 76 U/L (ref 39–117)
ALT SERPL W P-5'-P-CCNC: 24 U/L (ref 1–41)
ANION GAP SERPL CALCULATED.3IONS-SCNC: 7.3 MMOL/L (ref 5–15)
AST SERPL-CCNC: 26 U/L (ref 1–40)
BACTERIA UR QL AUTO: NORMAL /HPF
BASOPHILS # BLD AUTO: 0.05 10*3/MM3 (ref 0–0.2)
BASOPHILS NFR BLD AUTO: 0.9 % (ref 0–1.5)
BILIRUB SERPL-MCNC: 0.4 MG/DL (ref 0–1.2)
BILIRUB UR QL STRIP: NEGATIVE
BUN SERPL-MCNC: 28 MG/DL (ref 6–20)
BUN/CREAT SERPL: 20 (ref 7–25)
CALCIUM SPEC-SCNC: 10.5 MG/DL (ref 8.6–10.5)
CHLORIDE SERPL-SCNC: 104 MMOL/L (ref 98–107)
CHOLEST SERPL-MCNC: 186 MG/DL (ref 0–200)
CLARITY UR: CLEAR
CO2 SERPL-SCNC: 28.7 MMOL/L (ref 22–29)
COLOR UR: YELLOW
CREAT SERPL-MCNC: 1.4 MG/DL (ref 0.76–1.27)
DEPRECATED RDW RBC AUTO: 43.5 FL (ref 37–54)
EGFRCR SERPLBLD CKD-EPI 2021: 62.4 ML/MIN/1.73
EOSINOPHIL # BLD AUTO: 0.15 10*3/MM3 (ref 0–0.4)
EOSINOPHIL NFR BLD AUTO: 2.8 % (ref 0.3–6.2)
ERYTHROCYTE [DISTWIDTH] IN BLOOD BY AUTOMATED COUNT: 13.1 % (ref 12.3–15.4)
FOLATE SERPL-MCNC: >20 NG/ML (ref 4.78–24.2)
GLOBULIN UR ELPH-MCNC: 2.6 GM/DL
GLUCOSE SERPL-MCNC: 84 MG/DL (ref 65–99)
GLUCOSE UR STRIP-MCNC: NEGATIVE MG/DL
HCT VFR BLD AUTO: 39.7 % (ref 37.5–51)
HDLC SERPL-MCNC: 58 MG/DL (ref 40–60)
HGB BLD-MCNC: 13.7 G/DL (ref 13–17.7)
HGB UR QL STRIP.AUTO: NEGATIVE
HYALINE CASTS UR QL AUTO: NORMAL /LPF
IMM GRANULOCYTES # BLD AUTO: 0.02 10*3/MM3 (ref 0–0.05)
IMM GRANULOCYTES NFR BLD AUTO: 0.4 % (ref 0–0.5)
KETONES UR QL STRIP: NEGATIVE
LDLC SERPL CALC-MCNC: 116 MG/DL (ref 0–100)
LDLC/HDLC SERPL: 1.99 {RATIO}
LEUKOCYTE ESTERASE UR QL STRIP.AUTO: NEGATIVE
LIPASE SERPL-CCNC: 149 U/L (ref 13–60)
LYMPHOCYTES # BLD AUTO: 1.65 10*3/MM3 (ref 0.7–3.1)
LYMPHOCYTES NFR BLD AUTO: 30.3 % (ref 19.6–45.3)
MCH RBC QN AUTO: 30.9 PG (ref 26.6–33)
MCHC RBC AUTO-ENTMCNC: 34.5 G/DL (ref 31.5–35.7)
MCV RBC AUTO: 89.4 FL (ref 79–97)
MONOCYTES # BLD AUTO: 0.33 10*3/MM3 (ref 0.1–0.9)
MONOCYTES NFR BLD AUTO: 6.1 % (ref 5–12)
NEUTROPHILS NFR BLD AUTO: 3.24 10*3/MM3 (ref 1.7–7)
NEUTROPHILS NFR BLD AUTO: 59.5 % (ref 42.7–76)
NITRITE UR QL STRIP: NEGATIVE
NRBC BLD AUTO-RTO: 0 /100 WBC (ref 0–0.2)
PH UR STRIP.AUTO: 5.5 [PH] (ref 5–8)
PLATELET # BLD AUTO: 291 10*3/MM3 (ref 140–450)
PMV BLD AUTO: 10 FL (ref 6–12)
POTASSIUM SERPL-SCNC: 5.4 MMOL/L (ref 3.5–5.2)
PROT SERPL-MCNC: 7.3 G/DL (ref 6–8.5)
PROT UR QL STRIP: NEGATIVE
RBC # BLD AUTO: 4.44 10*6/MM3 (ref 4.14–5.8)
RBC # UR STRIP: NORMAL /HPF
REF LAB TEST METHOD: NORMAL
SODIUM SERPL-SCNC: 140 MMOL/L (ref 136–145)
SP GR UR STRIP: 1.03 (ref 1–1.03)
SQUAMOUS #/AREA URNS HPF: NORMAL /HPF
TRIGL SERPL-MCNC: 64 MG/DL (ref 0–150)
TSH SERPL DL<=0.05 MIU/L-ACNC: 0.34 UIU/ML (ref 0.27–4.2)
UROBILINOGEN UR QL STRIP: NORMAL
VIT B12 BLD-MCNC: 779 PG/ML (ref 211–946)
VLDLC SERPL-MCNC: 12 MG/DL (ref 5–40)
WBC # UR STRIP: NORMAL /HPF
WBC NRBC COR # BLD: 5.44 10*3/MM3 (ref 3.4–10.8)

## 2023-04-07 PROCEDURE — 82746 ASSAY OF FOLIC ACID SERUM: CPT

## 2023-04-07 PROCEDURE — 82150 ASSAY OF AMYLASE: CPT

## 2023-04-07 PROCEDURE — 81001 URINALYSIS AUTO W/SCOPE: CPT

## 2023-04-07 PROCEDURE — 83690 ASSAY OF LIPASE: CPT

## 2023-04-07 PROCEDURE — 80050 GENERAL HEALTH PANEL: CPT

## 2023-04-07 PROCEDURE — 82607 VITAMIN B-12: CPT

## 2023-04-07 PROCEDURE — 82306 VITAMIN D 25 HYDROXY: CPT

## 2023-04-07 PROCEDURE — 36415 COLL VENOUS BLD VENIPUNCTURE: CPT

## 2023-04-07 PROCEDURE — 80061 LIPID PANEL: CPT

## 2023-04-07 PROCEDURE — 73502 X-RAY EXAM HIP UNI 2-3 VIEWS: CPT

## 2023-04-10 DIAGNOSIS — N28.9 IMPAIRED RENAL FUNCTION: Primary | ICD-10-CM

## 2023-04-10 LAB — AMYLASE SERPL-CCNC: 141 U/L (ref 28–100)

## 2023-04-12 ENCOUNTER — TELEPHONE (OUTPATIENT)
Dept: GASTROENTEROLOGY | Facility: CLINIC | Age: 48
End: 2023-04-12
Payer: COMMERCIAL

## 2023-04-12 NOTE — TELEPHONE ENCOUNTER
Patient called and left vm requesting a call back about his scheduled appt. I called the patient and left a vm requesting a call back at his earliest convenience

## 2023-04-12 NOTE — TELEPHONE ENCOUNTER
Victorina Jacobs from patient's PCM's office called and left a vm requesting a call back to schedule a f/u appt. I contacted Victorina and scheduled patient for the next available appt with Caren Davalos.

## 2023-04-17 ENCOUNTER — TELEPHONE (OUTPATIENT)
Dept: GASTROENTEROLOGY | Facility: CLINIC | Age: 48
End: 2023-04-17
Payer: COMMERCIAL

## 2023-04-17 NOTE — TELEPHONE ENCOUNTER
Patient called and left a  requesting a return call.   Patient has concerns about most recent labs from pcp. Patient has an increase in lipase and amylase and was asking if he should be concerned about his pancreas. Please advise.

## 2023-04-18 DIAGNOSIS — R74.8 ELEVATED LIPASE: Primary | ICD-10-CM

## 2023-04-18 NOTE — TELEPHONE ENCOUNTER
Called patient and he is agreeable to doing the CMP. Patient already has a CT abdomen & pelvis scheduled for 4/25/2023.    Patient reported that he is concerned about his pancreas. Patient also reported that he has lost 90lbs in the last year from diet and excercise however, he chews large amounts of gum daily to suppress his appetite. Patient reported that he chews and swallows around 60-80 pieces of gum daily.

## 2023-04-20 ENCOUNTER — LAB (OUTPATIENT)
Dept: LAB | Facility: HOSPITAL | Age: 48
End: 2023-04-20
Payer: COMMERCIAL

## 2023-04-20 DIAGNOSIS — N28.9 IMPAIRED RENAL FUNCTION: ICD-10-CM

## 2023-04-20 DIAGNOSIS — R74.8 ELEVATED LIPASE: ICD-10-CM

## 2023-04-20 LAB
ALBUMIN SERPL-MCNC: 4.5 G/DL (ref 3.5–5.2)
ALBUMIN/GLOB SERPL: 1.8 G/DL
ALP SERPL-CCNC: 65 U/L (ref 39–117)
ALT SERPL W P-5'-P-CCNC: 29 U/L (ref 1–41)
AMYLASE SERPL-CCNC: 79 U/L (ref 28–100)
ANION GAP SERPL CALCULATED.3IONS-SCNC: 9 MMOL/L (ref 5–15)
AST SERPL-CCNC: 26 U/L (ref 1–40)
BILIRUB SERPL-MCNC: 0.3 MG/DL (ref 0–1.2)
BUN SERPL-MCNC: 26 MG/DL (ref 6–20)
BUN/CREAT SERPL: 19.3 (ref 7–25)
CALCIUM SPEC-SCNC: 9.6 MG/DL (ref 8.6–10.5)
CHLORIDE SERPL-SCNC: 104 MMOL/L (ref 98–107)
CO2 SERPL-SCNC: 27 MMOL/L (ref 22–29)
CREAT SERPL-MCNC: 1.35 MG/DL (ref 0.76–1.27)
EGFRCR SERPLBLD CKD-EPI 2021: 65.2 ML/MIN/1.73
GLOBULIN UR ELPH-MCNC: 2.5 GM/DL
GLUCOSE SERPL-MCNC: 76 MG/DL (ref 65–99)
LIPASE SERPL-CCNC: 45 U/L (ref 13–60)
POTASSIUM SERPL-SCNC: 4.8 MMOL/L (ref 3.5–5.2)
PROT SERPL-MCNC: 7 G/DL (ref 6–8.5)
SODIUM SERPL-SCNC: 140 MMOL/L (ref 136–145)

## 2023-04-20 PROCEDURE — 82150 ASSAY OF AMYLASE: CPT

## 2023-04-20 PROCEDURE — 83690 ASSAY OF LIPASE: CPT

## 2023-04-20 PROCEDURE — 36415 COLL VENOUS BLD VENIPUNCTURE: CPT

## 2023-04-20 PROCEDURE — 80053 COMPREHEN METABOLIC PANEL: CPT

## 2023-04-24 NOTE — PROGRESS NOTES
BUN and creatinine are still elevated but a little better than previous.  He needs to really be drinking more water and avoiding medicines that irritate your kidneys like anti-inflammatory medication i.e. Advil, Motrin, ibuprofen or aspirin.  Amylase and lipase are back down to normal.

## 2023-04-25 ENCOUNTER — HOSPITAL ENCOUNTER (OUTPATIENT)
Dept: MRI IMAGING | Facility: HOSPITAL | Age: 48
Discharge: HOME | End: 2023-04-25
Payer: COMMERCIAL

## 2023-04-25 ENCOUNTER — TELEPHONE (OUTPATIENT)
Dept: GASTROENTEROLOGY | Facility: CLINIC | Age: 48
End: 2023-04-25
Payer: COMMERCIAL

## 2023-04-25 ENCOUNTER — HOSPITAL ENCOUNTER (OUTPATIENT)
Dept: CT IMAGING | Facility: HOSPITAL | Age: 48
Discharge: HOME | End: 2023-04-25
Payer: COMMERCIAL

## 2023-04-25 DIAGNOSIS — M25.551 RIGHT HIP PAIN: ICD-10-CM

## 2023-04-25 DIAGNOSIS — R19.04 ABDOMINAL MASS, LEFT LOWER QUADRANT: ICD-10-CM

## 2023-04-25 DIAGNOSIS — K75.81 NASH (NONALCOHOLIC STEATOHEPATITIS): ICD-10-CM

## 2023-04-25 PROCEDURE — 73721 MRI JNT OF LWR EXTRE W/O DYE: CPT

## 2023-04-25 PROCEDURE — 74178 CT ABD&PLV WO CNTR FLWD CNTR: CPT

## 2023-04-25 PROCEDURE — 25510000001 IOPAMIDOL PER 1 ML

## 2023-04-25 RX ADMIN — IOPAMIDOL 100 ML: 755 INJECTION, SOLUTION INTRAVENOUS at 10:01

## 2023-04-25 NOTE — TELEPHONE ENCOUNTER
Patient was notified of lab results. Patient also reported that he has lost 90lbs in the last year from diet and excercise however, he chews large amounts of gum daily to suppress his appetite. Patient reported that he chews and swallows around 60-80 pieces of gum daily. Patient is requesting to know if the amount of gum and tiktaks he is consuming could be causing him problems.

## 2023-04-25 NOTE — TELEPHONE ENCOUNTER
----- Message from SERAFIN Cano sent at 4/24/2023 12:57 PM EDT -----  BUN and creatinine are still elevated but a little better than previous.  He needs to really be drinking more water and avoiding medicines that irritate your kidneys like anti-inflammatory medication i.e. Advil, Motrin, ibuprofen or aspirin.  Amylase and lipase are back down to normal.

## 2023-05-17 ENCOUNTER — OFFICE VISIT (OUTPATIENT)
Dept: GASTROENTEROLOGY | Facility: CLINIC | Age: 48
End: 2023-05-17
Payer: COMMERCIAL

## 2023-05-17 VITALS
HEIGHT: 68 IN | HEART RATE: 60 BPM | WEIGHT: 192.6 LBS | BODY MASS INDEX: 29.19 KG/M2 | SYSTOLIC BLOOD PRESSURE: 108 MMHG | DIASTOLIC BLOOD PRESSURE: 66 MMHG

## 2023-05-17 DIAGNOSIS — K75.81 NASH (NONALCOHOLIC STEATOHEPATITIS): ICD-10-CM

## 2023-05-17 DIAGNOSIS — R79.89 ELEVATED LFTS: Primary | ICD-10-CM

## 2023-05-17 DIAGNOSIS — R74.8 ELEVATED AMYLASE AND LIPASE: ICD-10-CM

## 2023-05-17 DIAGNOSIS — E05.00 GRAVES' DISEASE: ICD-10-CM

## 2023-05-17 PROCEDURE — 99214 OFFICE O/P EST MOD 30 MIN: CPT | Performed by: NURSE PRACTITIONER

## 2023-05-17 NOTE — PROGRESS NOTES
Chief Complaint   Dizziness (/)    History of Present Illness       Deniz Calderon is a 48 y.o. male who presents to [facility] for follow-up For elevated liver enzymes.  He is new to me today.  He was last seen in the office by nurse practitioner Alonzo Romero on 6/6/2022.      History of fatty liver disease.  FibroSure showed mild fatty liver with no fibrosis.  Most recent LFTs were normal.  He did have an episode a couple months ago where he had an elevated amylase and lipase level.  But since repeating those levels they are back to normal.    Had Truman Barr virus last year. Has lost a lot of weight 90-95 lbs in the past 2 years.  He has a history of Graves' disease with Raynaud's phenomenon.    He had a CT scan of the abdomen pelvis done on 4/25/2023 that showed no significant hepatic steatosis.  No suspicious liver lesion.  Liver is normal in size.  Very small fat-containing left.  Smaller than prior CT scan from March 2022.  Moderate amount of stool throughout the colon.    He does admit his bowels move well everyday. He denies any reflux, abd pain, N&V, rectal bleeding or melena. He admits his appetite is good.      He has been really working hard to lose weight.      Recently had a Cologuard which was negative.    EGD in the past for reflux. Never had screening colonoscopy.     Denies any significant GI family history---    Results       Result Review :       CMP        9/23/2022    09:35 4/7/2023    07:11 4/20/2023    07:03   CMP   Glucose 87   84   76     BUN 19   28   26     Creatinine 1.13   1.40   1.35     EGFR 81.2   62.4   65.2     Sodium 143   140   140     Potassium 4.6   5.4   4.8     Chloride 105   104   104     Calcium 9.4   10.5   9.6     Total Protein 6.8   7.3   7.0     Albumin 4.60   4.7   4.5     Globulin 2.2   2.6   2.5     Total Bilirubin 0.3   0.4   0.3     Alkaline Phosphatase 66   76   65     AST (SGOT) 19   26   26     ALT (SGPT) 19   24   29      Albumin/Globulin Ratio 2.1   1.8   1.8     BUN/Creatinine Ratio 16.8   20.0   19.3     Anion Gap 10.3   7.3   9.0       CBC        9/23/2022    09:35 4/7/2023    07:11   CBC   WBC 4.56   5.44     RBC 4.28   4.44     Hemoglobin 13.2   13.7     Hematocrit 39.1   39.7     MCV 91.4   89.4     MCH 30.8   30.9     MCHC 33.8   34.5     RDW 12.4   13.1     Platelets 265   291         Lipase   Lipase   Date Value Ref Range Status   04/20/2023 45 13 - 60 U/L Final     Amylase   Amylase   Date Value Ref Range Status   04/20/2023 79 28 - 100 U/L Final     Liver Workup   ALPHA -1 ANTITRYPSIN   Date Value Ref Range Status   04/06/2022 134 90 - 200 mg/dL Final     dsDNA   Date Value Ref Range Status   04/06/2022 Negative Negative Final     Expanded FANNY Screen   Date Value Ref Range Status   04/06/2022 Negative Negative Final     Smooth Muscle Ab   Date Value Ref Range Status   04/06/2022 5 0 - 19 Units Final     Comment:                      Negative                     0 - 19                   Weak positive               20 - 30                   Moderate to strong positive     >30   Actin Antibodies are found in 52-85% of patients with   autoimmune hepatitis or chronic active hepatitis and   in 22% of patients with primary biliary cirrhosis.     Ceruloplasmin   Date Value Ref Range Status   03/02/2022 38 (H) 16 - 31 mg/dL Final     Ferritin   Date Value Ref Range Status   04/06/2022 94.50 30.00 - 400.00 ng/mL Final     Immunofixation Result, Serum   Date Value Ref Range Status   04/06/2022 Comment  Final     Comment:     No monoclonality detected.     IgG   Date Value Ref Range Status   04/06/2022 1167 603 - 1613 mg/dL Final     IgA   Date Value Ref Range Status   04/06/2022 256 90 - 386 mg/dL Final     IgM   Date Value Ref Range Status   04/06/2022 88 20 - 172 mg/dL Final     Iron   Date Value Ref Range Status   04/06/2022 144 59 - 158 mcg/dL Final     TIBC   Date Value Ref Range Status   04/06/2022 362 298 - 536 mcg/dL Final      Iron Saturation   Date Value Ref Range Status   04/06/2022 40 20 - 50 % Final     Transferrin   Date Value Ref Range Status   04/06/2022 243 200 - 360 mg/dL Final     Mitochondrial Ab   Date Value Ref Range Status   04/06/2022 <20.0 0.0 - 20.0 Units Final     Comment:                                     Negative    0.0 - 20.0                                  Equivocal  20.1 - 24.9                                  Positive         >24.9  Mitochondrial (M2) Antibodies are found in 90-96% of  patients with primary biliary cirrhosis.     Protime   Date Value Ref Range Status   04/06/2022 10.8 9.4 - 12.0 Seconds Final     INR   Date Value Ref Range Status   04/06/2022 1.04 (L) 2.00 - 3.00 Final     ALPHA-FETOPROTEIN   Date Value Ref Range Status   03/02/2022 0.971 0 - 8.3 ng/mL Final     AFP   ALPHA-FETOPROTEIN   Date Value Ref Range Status   03/02/2022 0.971 0 - 8.3 ng/mL Final            Past Medical History       Past Medical History:   Diagnosis Date   • Allergic    • Fatty liver 3/2022    Indication with Truman Hale   • GERD (gastroesophageal reflux disease)    • Headache    • Hepatosplenomegaly 03/07/2022   • Hyperlipidemia    • Hypertension    • Hypothyroidism    • VILLANUEVA (nonalcoholic steatohepatitis) 09/09/2022   • Obesity    • Pancreatitis 4/2023    Not confirmed       Past Surgical History:   Procedure Laterality Date   • COLONOSCOPY           Current Outpatient Medications:   •  levothyroxine (SYNTHROID, LEVOTHROID) 150 MCG tablet, Take 1 tablet by mouth Daily., Disp: , Rfl:   •  multivitamin with minerals tablet tablet, Take 1 tablet by mouth Daily., Disp: , Rfl:      Allergies   Allergen Reactions   • Penicillins Unknown - High Severity     Mild to severe reaction as a baby       Family History   Problem Relation Age of Onset   • Parkinsonism Mother    • Heart disease Father    • Stroke Father    • Hypertension Father    • Hyperlipidemia Father    • Atrial fibrillation Father    • No Known Problems  "Sister    • No Known Problems Daughter    • Parkinsonism Maternal Uncle    • No Known Problems Maternal Grandmother    • Thyroid disease Maternal Grandfather    • Heart disease Paternal Grandmother    • Heart disease Paternal Grandfather         Social History     Social History Narrative   • Not on file       Objective       Review of Systems   Constitutional: Negative for fatigue, fever, unexpected weight gain and unexpected weight loss.   HENT: Negative for trouble swallowing.    Respiratory: Negative for cough, choking, chest tightness, shortness of breath, wheezing and stridor.    Cardiovascular: Negative for chest pain, palpitations and leg swelling.   Gastrointestinal: Negative for abdominal distention, abdominal pain, anal bleeding, blood in stool, constipation, diarrhea, nausea, rectal pain, vomiting, GERD and indigestion.        Vital Signs:   /66 (BP Location: Left arm, Patient Position: Sitting, Cuff Size: Adult)   Pulse 60   Ht 172.7 cm (68\")   Wt 87.4 kg (192 lb 9.6 oz)   BMI 29.28 kg/m²       Physical Exam  Constitutional:       General: He is not in acute distress.     Appearance: He is well-developed. He is not ill-appearing.   HENT:      Head: Normocephalic.   Eyes:      Pupils: Pupils are equal, round, and reactive to light.   Cardiovascular:      Rate and Rhythm: Normal rate and regular rhythm.      Heart sounds: Normal heart sounds.   Pulmonary:      Effort: Pulmonary effort is normal.      Breath sounds: Normal breath sounds.   Abdominal:      General: Bowel sounds are normal. There is no distension.      Palpations: Abdomen is soft. There is no mass.      Tenderness: There is no abdominal tenderness. There is no guarding or rebound.      Hernia: No hernia is present.   Musculoskeletal:         General: Normal range of motion.   Skin:     General: Skin is warm and dry.   Neurological:      Mental Status: He is alert and oriented to person, place, and time.   Psychiatric:         " Speech: Speech normal.         Behavior: Behavior normal.         Judgment: Judgment normal.           Assessment & Plan          Assessment and Plan    Diagnoses and all orders for this visit:    1. Elevated LFTs (Primary)    2. VILLANUEVA (nonalcoholic steatohepatitis)    3. Elevated amylase and lipase    4. Graves' disease    Reviewed most recent labs and imaging results with him today.  No evidence of hepatic steatosis on most recent CT scan.  Most recent LFTs are normal.  Patient has worked really hard lately to lose almost 100 pounds in the last 2 years.  I think this is greatly improved his fatty liver disease.  Bowels moving well currently.  No reflux to report.  Continue a high-protein low-carb diet.  He needs to make sure he is getting enough healthy fat in his diet and enough water to stay well-hydrated.  I did recommend a screening colonoscopy at some point.  He did have a negative Cologuard.  Patient to call the office with any issues.  Patient to follow-up with me in 6 months.  Patient is agreeable to the plan.            Follow Up       Follow Up   Return in about 6 months (around 11/17/2023) for FATTY LIVER.  Patient was given instructions and counseling regarding his condition or for health maintenance advice. Please see specific information pulled into the AVS if appropriate.

## 2023-10-19 ENCOUNTER — OFFICE VISIT (OUTPATIENT)
Dept: INTERNAL MEDICINE | Facility: CLINIC | Age: 48
End: 2023-10-19
Payer: COMMERCIAL

## 2023-10-19 VITALS
HEIGHT: 68 IN | HEART RATE: 70 BPM | OXYGEN SATURATION: 95 % | DIASTOLIC BLOOD PRESSURE: 80 MMHG | SYSTOLIC BLOOD PRESSURE: 118 MMHG | BODY MASS INDEX: 29.77 KG/M2 | TEMPERATURE: 98 F | WEIGHT: 196.4 LBS

## 2023-10-19 DIAGNOSIS — M25.551 CHRONIC RIGHT HIP PAIN: ICD-10-CM

## 2023-10-19 DIAGNOSIS — G89.29 CHRONIC RIGHT HIP PAIN: ICD-10-CM

## 2023-10-19 DIAGNOSIS — E89.0 POSTABLATIVE HYPOTHYROIDISM: ICD-10-CM

## 2023-10-19 DIAGNOSIS — K75.81 NASH (NONALCOHOLIC STEATOHEPATITIS): Primary | ICD-10-CM

## 2023-10-19 DIAGNOSIS — E78.2 MIXED HYPERLIPIDEMIA: ICD-10-CM

## 2023-10-19 DIAGNOSIS — H66.002 NON-RECURRENT ACUTE SUPPURATIVE OTITIS MEDIA OF LEFT EAR WITHOUT SPONTANEOUS RUPTURE OF TYMPANIC MEMBRANE: ICD-10-CM

## 2023-10-19 PROCEDURE — 99214 OFFICE O/P EST MOD 30 MIN: CPT

## 2023-10-19 RX ORDER — AZITHROMYCIN 250 MG/1
250 TABLET, FILM COATED ORAL DAILY
COMMUNITY
Start: 2023-10-12

## 2023-10-19 RX ORDER — CEFDINIR 300 MG/1
300 CAPSULE ORAL 2 TIMES DAILY
Qty: 14 CAPSULE | Refills: 0 | Status: SHIPPED | OUTPATIENT
Start: 2023-10-19 | End: 2023-10-26

## 2023-10-19 NOTE — ASSESSMENT & PLAN NOTE
Left otitis media.  TM is erythematous. He has almost finished azithromycin.  If no improvement in symptoms, he may start cefdinir.  Recommend starting antihistamine as well.   He reports a mild reaction to pcns as an infant. Discussed possibility for cross sensitivity reaction. Allergic response symptoms/instructions discussed.

## 2023-10-19 NOTE — ASSESSMENT & PLAN NOTE
History of elevated LFT's with VILLANUEVA diagnosis following EBV infection.  He is followed by gastroenterology.  He has lost a significant amount of weight and last labs were significantly improved.  Continue with lifestyle modifications.

## 2023-10-19 NOTE — ASSESSMENT & PLAN NOTE
S/p radioactive iodine thyroid ablation/  He is followed by endocrinology.  He is currently on synthroid 150 mcg daily.  TSH was normal on last labs.  Continue current regimen.

## 2023-10-19 NOTE — PROGRESS NOTES
"Chief Complaint  No chief complaint on file.    History of Present Illness  SUBJECTIVE  Deniz Calderon presents to [facility] follow up on chronic disease management.    He is being treated for a left ear infection with azithromycin. Patient states that symptoms have improved some.    He denies any other issues or concerns.      Past Medical History:   Diagnosis Date    Allergic     Fatty liver 3/2022    Indication with Truman Hale    GERD (gastroesophageal reflux disease)     Headache     Hepatosplenomegaly 03/07/2022    Hyperlipidemia     Hypertension     Hypothyroidism     VILLANUEVA (nonalcoholic steatohepatitis) 09/09/2022    Obesity     Pancreatitis 4/2023    Not confirmed      Family History   Problem Relation Age of Onset    Parkinsonism Mother     Heart disease Father     Stroke Father     Hypertension Father     Hyperlipidemia Father     Atrial fibrillation Father     No Known Problems Sister     No Known Problems Daughter     Parkinsonism Maternal Uncle     No Known Problems Maternal Grandmother     Thyroid disease Maternal Grandfather     Heart disease Paternal Grandmother     Heart disease Paternal Grandfather       Past Surgical History:   Procedure Laterality Date    COLONOSCOPY          Current Outpatient Medications:     azithromycin (ZITHROMAX) 250 MG tablet, Take 1 tablet by mouth Daily., Disp: , Rfl:     levothyroxine (SYNTHROID, LEVOTHROID) 150 MCG tablet, Take 1 tablet by mouth Daily., Disp: , Rfl:     multivitamin with minerals tablet tablet, Take 1 tablet by mouth Daily., Disp: , Rfl:     cefdinir (OMNICEF) 300 MG capsule, Take 1 capsule by mouth 2 (Two) Times a Day for 7 days., Disp: 14 capsule, Rfl: 0    OBJECTIVE  Vital Signs:   /80 (BP Location: Left arm, Patient Position: Sitting, Cuff Size: Adult)   Pulse 70   Temp 98 °F (36.7 °C) (Temporal)   Ht 172.7 cm (68\")   Wt 89.1 kg (196 lb 6.4 oz)   SpO2 95%   BMI 29.86 kg/m²    Estimated body mass " "index is 29.86 kg/m² as calculated from the following:    Height as of this encounter: 172.7 cm (68\").    Weight as of this encounter: 89.1 kg (196 lb 6.4 oz).     Wt Readings from Last 3 Encounters:   10/19/23 89.1 kg (196 lb 6.4 oz)   05/17/23 87.4 kg (192 lb 9.6 oz)   03/21/23 87.7 kg (193 lb 6.4 oz)     BP Readings from Last 3 Encounters:   10/19/23 118/80   05/17/23 108/66   03/21/23 98/65       Physical Exam  Vitals and nursing note reviewed.   Constitutional:       Appearance: Normal appearance.   HENT:      Head: Normocephalic.      Right Ear: A middle ear effusion is present.      Left Ear: A middle ear effusion is present. Tympanic membrane is erythematous.   Eyes:      Extraocular Movements: Extraocular movements intact.      Conjunctiva/sclera: Conjunctivae normal.   Cardiovascular:      Rate and Rhythm: Normal rate and regular rhythm.      Heart sounds: Normal heart sounds. No murmur heard.  Pulmonary:      Effort: Pulmonary effort is normal.      Breath sounds: Normal breath sounds. No wheezing or rales.   Abdominal:      General: Bowel sounds are normal.      Palpations: Abdomen is soft.      Tenderness: There is no abdominal tenderness. There is no guarding.   Musculoskeletal:         General: No swelling. Normal range of motion.   Skin:     General: Skin is warm and dry.   Neurological:      General: No focal deficit present.      Mental Status: He is alert. Mental status is at baseline.   Psychiatric:         Mood and Affect: Mood normal.         Thought Content: Thought content normal.          Result Review        No Images in the past 120 days found..     The above data has been reviewed by SERAFIN Dugan 10/19/2023 08:25 EDT.          Patient Care Team:  Lin Blevins APRN as PCP - General (Internal Medicine)  SusannahCaren APRN as Nurse Practitioner (Nurse Practitioner)            ASSESSMENT & PLAN    Diagnoses and all orders for this visit:    1. VILLANUEVA (nonalcoholic " steatohepatitis) (Primary)  Assessment & Plan:  History of elevated LFT's with VILLANUEVA diagnosis following EBV infection.  He is followed by gastroenterology.  He has lost a significant amount of weight and last labs were significantly improved.  Continue with lifestyle modifications.     Orders:  -     CBC & Differential; Future  -     Comprehensive Metabolic Panel; Future  -     TSH Rfx On Abnormal To Free T4; Future    2. Postablative hypothyroidism  Assessment & Plan:  S/p radioactive iodine thyroid ablation/  He is followed by endocrinology.  He is currently on synthroid 150 mcg daily.  TSH was normal on last labs.  Continue current regimen.       3. Mixed hyperlipidemia  Assessment & Plan:  Will check labs with next OV.      Orders:  -     Lipid Panel; Future    4. Non-recurrent acute suppurative otitis media of left ear without spontaneous rupture of tympanic membrane  Assessment & Plan:  Left otitis media.  TM is erythematous. He has almost finished azithromycin.  If no improvement in symptoms, he may start cefdinir.  Recommend starting antihistamine as well.   He reports a mild reaction to pcns as an infant. Discussed possibility for cross sensitivity reaction. Allergic response symptoms/instructions discussed.    Orders:  -     cefdinir (OMNICEF) 300 MG capsule; Take 1 capsule by mouth 2 (Two) Times a Day for 7 days.  Dispense: 14 capsule; Refill: 0    5. Chronic right hip pain  Assessment & Plan:  Patient states he has improved some.  He states that he is active and exercises.  We discussed MRI results. He would like to defer ortho referral for now.            Tobacco Use: Medium Risk (10/19/2023)    Patient History     Smoking Tobacco Use: Former     Smokeless Tobacco Use: Never     Passive Exposure: Not on file       Follow Up     Return in about 6 months (around 4/19/2024) for Annual physical.    Please note that portions of this note were completed with a voice recognition program.    Patient was given  instructions and counseling regarding his condition or for health maintenance advice. Please see specific information pulled into the AVS if appropriate.   I have reviewed information obtained and documented by others and I have confirmed the accuracy of this documented note.    SERAFIN Dugan

## 2023-10-19 NOTE — ASSESSMENT & PLAN NOTE
Patient states he has improved some.  He states that he is active and exercises.  We discussed MRI results. He would like to defer ortho referral for now.

## 2024-01-30 ENCOUNTER — OFFICE VISIT (OUTPATIENT)
Dept: GASTROENTEROLOGY | Facility: CLINIC | Age: 49
End: 2024-01-30
Payer: COMMERCIAL

## 2024-01-30 VITALS
WEIGHT: 201 LBS | DIASTOLIC BLOOD PRESSURE: 65 MMHG | HEIGHT: 68 IN | SYSTOLIC BLOOD PRESSURE: 112 MMHG | BODY MASS INDEX: 30.46 KG/M2 | HEART RATE: 73 BPM

## 2024-01-30 DIAGNOSIS — R74.8 ELEVATED LIVER ENZYMES: Primary | ICD-10-CM

## 2024-01-30 DIAGNOSIS — K76.0 FATTY LIVER: ICD-10-CM

## 2024-01-30 DIAGNOSIS — Z86.39 HISTORY OF GRAVES' DISEASE: ICD-10-CM

## 2024-01-30 DIAGNOSIS — Z83.79: ICD-10-CM

## 2024-01-30 PROCEDURE — 99214 OFFICE O/P EST MOD 30 MIN: CPT | Performed by: NURSE PRACTITIONER

## 2024-01-30 NOTE — PROGRESS NOTES
Chief Complaint   Fatty Liver    History of Present Illness       Deniz Calderon is a 48 y.o. male who presents to [facility] for follow-up for fatty liver.     History of fatty liver disease.  FibroSure showed mild fatty liver with no fibrosis.  Most recent LFTs were normal.  He did have an episode a couple months ago where he had an elevated amylase and lipase level.  But since repeating those levels they are back to normal.     Had Truman Barr virus last year. Has lost a lot of weight 90-95 lbs in the past 2 years.  He has a history of Graves' disease with Raynaud's phenomenon.     He had a CT scan of the abdomen pelvis done on 4/25/2023 that showed no significant hepatic steatosis.  No suspicious liver lesion.  Liver is normal in size.  Very small fat-containing left.  Smaller than prior CT scan from March 2022.  Moderate amount of stool throughout the colon.     Recently had a Cologuard which was negative.     EGD in the past for reflux. Never had screening colonoscopy.      GI FH---Father with diverticulitis.         Doing well lately. Denies any reflux or dysphagia. Denies any rectal bleeding or melena. Bowels moving well.  Is still working hard on his diet and exercise.  Is due to get labs done for PCP.  Denies any GI issues today.  Results       Result Review :       CMP          4/7/2023    07:11 4/20/2023    07:03   CMP   Glucose 84  76    BUN 28  26    Creatinine 1.40  1.35    EGFR 62.4  65.2    Sodium 140  140    Potassium 5.4  4.8    Chloride 104  104    Calcium 10.5  9.6    Total Protein 7.3  7.0    Albumin 4.7  4.5    Globulin 2.6  2.5    Total Bilirubin 0.4  0.3    Alkaline Phosphatase 76  65    AST (SGOT) 26  26    ALT (SGPT) 24  29    Albumin/Globulin Ratio 1.8  1.8    BUN/Creatinine Ratio 20.0  19.3    Anion Gap 7.3  9.0      CBC          4/7/2023    07:11   CBC   WBC 5.44    RBC 4.44    Hemoglobin 13.7    Hematocrit 39.7    MCV 89.4    MCH 30.9    MCHC 34.5     RDW 13.1    Platelets 291      Lipid Panel          4/7/2023    07:11   Lipid Panel   Total Cholesterol 186    Triglycerides 64    HDL Cholesterol 58    VLDL Cholesterol 12    LDL Cholesterol  116    LDL/HDL Ratio 1.99      TSH          4/7/2023    07:11   TSH   TSH 0.336        Lipase   Lipase   Date Value Ref Range Status   04/20/2023 45 13 - 60 U/L Final     Amylase   Amylase   Date Value Ref Range Status   04/20/2023 79 28 - 100 U/L Final     Iron Profile   Iron   Date Value Ref Range Status   04/06/2022 144 59 - 158 mcg/dL Final     TIBC   Date Value Ref Range Status   04/06/2022 362 298 - 536 mcg/dL Final     Iron Saturation (TSAT)   Date Value Ref Range Status   04/06/2022 40 20 - 50 % Final     Transferrin   Date Value Ref Range Status   04/06/2022 243 200 - 360 mg/dL Final     Ferritin   Ferritin   Date Value Ref Range Status   04/06/2022 94.50 30.00 - 400.00 ng/mL Final     Liver Workup   ALPHA -1 ANTITRYPSIN   Date Value Ref Range Status   04/06/2022 134 90 - 200 mg/dL Final     dsDNA   Date Value Ref Range Status   04/06/2022 Negative Negative Final     Expanded FANNY Screen   Date Value Ref Range Status   04/06/2022 Negative Negative Final     Smooth Muscle Ab   Date Value Ref Range Status   04/06/2022 5 0 - 19 Units Final     Comment:                      Negative                     0 - 19                   Weak positive               20 - 30                   Moderate to strong positive     >30   Actin Antibodies are found in 52-85% of patients with   autoimmune hepatitis or chronic active hepatitis and   in 22% of patients with primary biliary cirrhosis.     Ceruloplasmin   Date Value Ref Range Status   03/02/2022 38 (H) 16 - 31 mg/dL Final     Ferritin   Date Value Ref Range Status   04/06/2022 94.50 30.00 - 400.00 ng/mL Final     Immunofixation Result, Serum   Date Value Ref Range Status   04/06/2022 Comment  Final     Comment:     No monoclonality detected.     IgG   Date Value Ref Range Status    04/06/2022 1167 603 - 1613 mg/dL Final     IgA   Date Value Ref Range Status   04/06/2022 256 90 - 386 mg/dL Final     IgM   Date Value Ref Range Status   04/06/2022 88 20 - 172 mg/dL Final     Iron   Date Value Ref Range Status   04/06/2022 144 59 - 158 mcg/dL Final     TIBC   Date Value Ref Range Status   04/06/2022 362 298 - 536 mcg/dL Final     Iron Saturation (TSAT)   Date Value Ref Range Status   04/06/2022 40 20 - 50 % Final     Transferrin   Date Value Ref Range Status   04/06/2022 243 200 - 360 mg/dL Final     Mitochondrial Ab   Date Value Ref Range Status   04/06/2022 <20.0 0.0 - 20.0 Units Final     Comment:                                     Negative    0.0 - 20.0                                  Equivocal  20.1 - 24.9                                  Positive         >24.9  Mitochondrial (M2) Antibodies are found in 90-96% of  patients with primary biliary cirrhosis.     Protime   Date Value Ref Range Status   04/06/2022 10.8 9.4 - 12.0 Seconds Final     INR   Date Value Ref Range Status   04/06/2022 1.04 (L) 2.00 - 3.00 Final     ALPHA-FETOPROTEIN   Date Value Ref Range Status   03/02/2022 0.971 0 - 8.3 ng/mL Final               Past Medical History       Past Medical History:   Diagnosis Date    Allergic     Fatty liver 3/2022    Indication with Truman Hale    GERD (gastroesophageal reflux disease)     Headache     Hepatosplenomegaly 03/07/2022    Hyperlipidemia     Hypertension     Hypothyroidism     VILLANUEVA (nonalcoholic steatohepatitis) 09/09/2022    Obesity     Pancreatitis 4/2023    Not confirmed       Past Surgical History:   Procedure Laterality Date    COLONOSCOPY           Current Outpatient Medications:     levothyroxine (SYNTHROID, LEVOTHROID) 150 MCG tablet, Take 1 tablet by mouth Daily., Disp: , Rfl:     multivitamin with minerals tablet tablet, Take 1 tablet by mouth Daily., Disp: , Rfl:      Allergies   Allergen Reactions    Penicillins Unknown - High Severity     Mild to severe  "reaction as a baby       Family History   Problem Relation Age of Onset    Parkinsonism Mother     Heart disease Father     Stroke Father     Hypertension Father     Hyperlipidemia Father     Atrial fibrillation Father     No Known Problems Sister     No Known Problems Daughter     Parkinsonism Maternal Uncle     No Known Problems Maternal Grandmother     Thyroid disease Maternal Grandfather     Heart disease Paternal Grandmother     Heart disease Paternal Grandfather         Social History     Social History Narrative    Not on file       Objective       Review of Systems   Constitutional:  Negative for appetite change, fatigue, fever, unexpected weight gain and unexpected weight loss.   HENT:  Negative for trouble swallowing.    Respiratory:  Negative for cough, choking, chest tightness, shortness of breath, wheezing and stridor.    Cardiovascular:  Negative for chest pain, palpitations and leg swelling.   Gastrointestinal:  Negative for abdominal distention, abdominal pain, anal bleeding, blood in stool, constipation, diarrhea, nausea, rectal pain, vomiting, GERD and indigestion.        Vital Signs:   /65 (BP Location: Right arm, Patient Position: Sitting, Cuff Size: Adult)   Pulse 73   Ht 172.7 cm (68\")   Wt 91.2 kg (201 lb)   BMI 30.56 kg/m²       Physical Exam  Constitutional:       General: He is not in acute distress.     Appearance: He is well-developed. He is not ill-appearing.   HENT:      Head: Normocephalic.   Eyes:      Pupils: Pupils are equal, round, and reactive to light.   Cardiovascular:      Rate and Rhythm: Normal rate and regular rhythm.      Heart sounds: Normal heart sounds.   Pulmonary:      Effort: Pulmonary effort is normal.      Breath sounds: Normal breath sounds.   Abdominal:      General: Bowel sounds are normal. There is no distension.      Palpations: Abdomen is soft. There is no mass.      Tenderness: There is no abdominal tenderness. There is no guarding or rebound.      " Hernia: No hernia is present.   Musculoskeletal:         General: Normal range of motion.   Skin:     General: Skin is warm and dry.   Neurological:      Mental Status: He is alert and oriented to person, place, and time.   Psychiatric:         Speech: Speech normal.         Behavior: Behavior normal.         Judgment: Judgment normal.           Assessment & Plan          Assessment and Plan    Diagnoses and all orders for this visit:    1. Elevated liver enzymes (Primary)    2. Fatty liver    3. FH: diverticulitis    4. History of Graves' disease      Reviewed most recent labs with him today.  He is due to get his liver labs checked.  His PCP ordered labs and he is going to get those done.  Patient to let me know when he is gotten those labs done so I can review them. Continue high-fiber/low-fat diet.  Continue to avoid alcohol and NSAIDs.  Patient to consider getting a screening colonoscopy in 1 more year since it has only been 2 years since he did his negative Cologuard.  Patient to call the office with any issues.  Patient to follow-up with me in 1 year.  Patient is agreeable to the plan.          Follow Up       Follow Up   Return in about 1 year (around 1/30/2025) for FATTY LIVER.  Patient was given instructions and counseling regarding his condition or for health maintenance advice. Please see specific information pulled into the AVS if appropriate.

## 2024-04-19 ENCOUNTER — OFFICE VISIT (OUTPATIENT)
Dept: INTERNAL MEDICINE | Age: 49
End: 2024-04-19
Payer: COMMERCIAL

## 2024-04-19 VITALS
OXYGEN SATURATION: 97 % | TEMPERATURE: 98.4 F | RESPIRATION RATE: 16 BRPM | DIASTOLIC BLOOD PRESSURE: 75 MMHG | BODY MASS INDEX: 30.89 KG/M2 | HEART RATE: 64 BPM | WEIGHT: 203.8 LBS | SYSTOLIC BLOOD PRESSURE: 110 MMHG | HEIGHT: 68 IN

## 2024-04-19 DIAGNOSIS — Z00.00 ANNUAL PHYSICAL EXAM: Primary | ICD-10-CM

## 2024-04-19 PROCEDURE — 99396 PREV VISIT EST AGE 40-64: CPT

## 2024-04-19 NOTE — ASSESSMENT & PLAN NOTE
TDAP-unsure-possibly had with one of his daughters   Alcohol use-socially-not regularly.   Denies tobacco use.  He is active and exercises.  He does get regular dental exams.   Patient was counseled on regular eye exams-he will make an appointment.  Labs pending.

## 2024-04-19 NOTE — PROGRESS NOTES
Chief Complaint  Annual Exam (Has a couple of dark areas on his back he would like looked at. ) and Blurred Vision (On and off for a year or so. Some episodes are worse than others. No headaches. )    History of Present Illness  SUBJECTIVE  Deniz Calderon presents to [facility] for his annual physical exam.    Patient states that he has blurry vision intermittently. He states that it is not bad or hinder his daily life. Last eye exam. Denies loss of vision or headaches.    He does have a couple areas on his skin that he wants assessed. He admits that he spent a lot of time outside. Patient states he did just notice them and had not noticed them before. He is unsure if they have been there.     He is doing well overall.    He denies any chest pain, soa, palpitations, nausea, vomiting, diarrhea, changes to bowel/bladder habits.     Past Medical History:   Diagnosis Date    Allergic     Fatty liver 3/2022    Indication with Truman Hale    GERD (gastroesophageal reflux disease)     Headache     Hepatosplenomegaly 03/07/2022    Hyperlipidemia     Hypertension     Hypothyroidism     VILLANUEVA (nonalcoholic steatohepatitis) 09/09/2022    Obesity     Pancreatitis 4/2023    Not confirmed      Family History   Problem Relation Age of Onset    Parkinsonism Mother     Heart disease Father     Stroke Father     Hypertension Father     Hyperlipidemia Father     Atrial fibrillation Father     No Known Problems Sister     No Known Problems Daughter     Parkinsonism Maternal Uncle     No Known Problems Maternal Grandmother     Thyroid disease Maternal Grandfather     Heart disease Paternal Grandmother     Heart disease Paternal Grandfather       Past Surgical History:   Procedure Laterality Date    COLONOSCOPY          Current Outpatient Medications:     levothyroxine (SYNTHROID, LEVOTHROID) 150 MCG tablet, Take 1 tablet by mouth Daily., Disp: , Rfl:     multivitamin with minerals tablet tablet, Take 1  "tablet by mouth Daily., Disp: , Rfl:     OBJECTIVE  Vital Signs:   /75 (BP Location: Left arm, Patient Position: Sitting, Cuff Size: Large Adult)   Pulse 64   Temp 98.4 °F (36.9 °C) (Temporal)   Resp 16   Ht 172.7 cm (68\")   Wt 92.4 kg (203 lb 12.8 oz)   SpO2 97%   BMI 30.99 kg/m²    Estimated body mass index is 30.99 kg/m² as calculated from the following:    Height as of this encounter: 172.7 cm (68\").    Weight as of this encounter: 92.4 kg (203 lb 12.8 oz).     Wt Readings from Last 3 Encounters:   04/19/24 92.4 kg (203 lb 12.8 oz)   01/30/24 91.2 kg (201 lb)   10/19/23 89.1 kg (196 lb 6.4 oz)     BP Readings from Last 3 Encounters:   04/19/24 110/75   01/30/24 112/65   10/19/23 118/80       Physical Exam  Vitals and nursing note reviewed.   Constitutional:       Appearance: Normal appearance.   HENT:      Head: Normocephalic.      Right Ear: Tympanic membrane normal.      Left Ear: Tympanic membrane normal.   Eyes:      Extraocular Movements: Extraocular movements intact.      Conjunctiva/sclera: Conjunctivae normal.   Cardiovascular:      Rate and Rhythm: Normal rate and regular rhythm.      Heart sounds: Normal heart sounds. No murmur heard.  Pulmonary:      Effort: Pulmonary effort is normal.      Breath sounds: Normal breath sounds. No wheezing or rales.   Abdominal:      General: Bowel sounds are normal.      Palpations: Abdomen is soft.      Tenderness: There is no abdominal tenderness. There is no guarding.   Musculoskeletal:         General: No swelling. Normal range of motion.      Cervical back: Normal range of motion and neck supple.      Right lower leg: No edema.      Left lower leg: No edema.   Skin:     General: Skin is warm and dry.      Findings: No lesion or rash.      Comments: No suspicious moles or lesions   Neurological:      General: No focal deficit present.      Mental Status: He is alert and oriented to person, place, and time. Mental status is at baseline.   Psychiatric:  "        Mood and Affect: Mood normal.         Behavior: Behavior normal.         Thought Content: Thought content normal.         Judgment: Judgment normal.          Result Review        No Images in the past 120 days found..     The above data has been reviewed by SERAFIN Dugan 04/19/2024 16:23 EDT.          Patient Care Team:  Lin Blevins APRN as PCP - General (Internal Medicine)  Caren Davalos APRN as Nurse Practitioner (Nurse Practitioner)    BMI is >= 30 and <35. (Class 1 Obesity). The following options were offered after discussion;: exercise counseling/recommendations and nutrition counseling/recommendations       ASSESSMENT & PLAN    Diagnoses and all orders for this visit:    1. Annual physical exam (Primary)  Assessment & Plan:  TDAP-unsure-possibly had with one of his daughters   Alcohol use-socially-not regularly.   Denies tobacco use.  He is active and exercises.  He does get regular dental exams.   Patient was counseled on regular eye exams-he will make an appointment.  Labs pending.       Patient states that he has blurry vision intermittently. He states that it is not bad or hinder his daily life. Last eye exam. Denies loss of vision or headaches-pt will make appt with optometry.    Tobacco Use: Medium Risk (4/19/2024)    Patient History     Smoking Tobacco Use: Former     Smokeless Tobacco Use: Never     Passive Exposure: Not on file       Follow Up     Return in about 6 months (around 10/19/2024) for Recheck.    Please note that portions of this note were completed with a voice recognition program.    Patient was given instructions and counseling regarding his condition or for health maintenance advice. Please see specific information pulled into the AVS if appropriate.   I have reviewed information obtained and documented by others and I have confirmed the accuracy of this documented note.    SERAFIN Dugan

## 2024-05-02 ENCOUNTER — LAB (OUTPATIENT)
Dept: LAB | Facility: HOSPITAL | Age: 49
End: 2024-05-02
Payer: COMMERCIAL

## 2024-05-02 DIAGNOSIS — E78.2 MIXED HYPERLIPIDEMIA: ICD-10-CM

## 2024-05-02 DIAGNOSIS — K75.81 NASH (NONALCOHOLIC STEATOHEPATITIS): ICD-10-CM

## 2024-05-02 LAB
ALBUMIN SERPL-MCNC: 4.4 G/DL (ref 3.5–5.2)
ALBUMIN/GLOB SERPL: 1.9 G/DL
ALP SERPL-CCNC: 63 U/L (ref 39–117)
ALT SERPL W P-5'-P-CCNC: 38 U/L (ref 1–41)
ANION GAP SERPL CALCULATED.3IONS-SCNC: 13.9 MMOL/L (ref 5–15)
AST SERPL-CCNC: 36 U/L (ref 1–40)
BASOPHILS # BLD AUTO: 0.04 10*3/MM3 (ref 0–0.2)
BASOPHILS NFR BLD AUTO: 0.8 % (ref 0–1.5)
BILIRUB SERPL-MCNC: 0.4 MG/DL (ref 0–1.2)
BUN SERPL-MCNC: 24 MG/DL (ref 6–20)
BUN/CREAT SERPL: 18.5 (ref 7–25)
CALCIUM SPEC-SCNC: 9.7 MG/DL (ref 8.6–10.5)
CHLORIDE SERPL-SCNC: 107 MMOL/L (ref 98–107)
CHOLEST SERPL-MCNC: 187 MG/DL (ref 0–200)
CO2 SERPL-SCNC: 23.1 MMOL/L (ref 22–29)
CREAT SERPL-MCNC: 1.3 MG/DL (ref 0.76–1.27)
DEPRECATED RDW RBC AUTO: 45.2 FL (ref 37–54)
EGFRCR SERPLBLD CKD-EPI 2021: 67.8 ML/MIN/1.73
EOSINOPHIL # BLD AUTO: 0.11 10*3/MM3 (ref 0–0.4)
EOSINOPHIL NFR BLD AUTO: 2.2 % (ref 0.3–6.2)
ERYTHROCYTE [DISTWIDTH] IN BLOOD BY AUTOMATED COUNT: 13.1 % (ref 12.3–15.4)
GLOBULIN UR ELPH-MCNC: 2.3 GM/DL
GLUCOSE SERPL-MCNC: 82 MG/DL (ref 65–99)
HCT VFR BLD AUTO: 39.6 % (ref 37.5–51)
HDLC SERPL-MCNC: 66 MG/DL (ref 40–60)
HGB BLD-MCNC: 12.8 G/DL (ref 13–17.7)
IMM GRANULOCYTES # BLD AUTO: 0.01 10*3/MM3 (ref 0–0.05)
IMM GRANULOCYTES NFR BLD AUTO: 0.2 % (ref 0–0.5)
LDLC SERPL CALC-MCNC: 109 MG/DL (ref 0–100)
LDLC/HDLC SERPL: 1.65 {RATIO}
LYMPHOCYTES # BLD AUTO: 1.37 10*3/MM3 (ref 0.7–3.1)
LYMPHOCYTES NFR BLD AUTO: 26.8 % (ref 19.6–45.3)
MCH RBC QN AUTO: 30.5 PG (ref 26.6–33)
MCHC RBC AUTO-ENTMCNC: 32.3 G/DL (ref 31.5–35.7)
MCV RBC AUTO: 94.3 FL (ref 79–97)
MONOCYTES # BLD AUTO: 0.31 10*3/MM3 (ref 0.1–0.9)
MONOCYTES NFR BLD AUTO: 6.1 % (ref 5–12)
NEUTROPHILS NFR BLD AUTO: 3.27 10*3/MM3 (ref 1.7–7)
NEUTROPHILS NFR BLD AUTO: 63.9 % (ref 42.7–76)
NRBC BLD AUTO-RTO: 0 /100 WBC (ref 0–0.2)
PLATELET # BLD AUTO: 244 10*3/MM3 (ref 140–450)
PMV BLD AUTO: 10 FL (ref 6–12)
POTASSIUM SERPL-SCNC: 4.6 MMOL/L (ref 3.5–5.2)
PROT SERPL-MCNC: 6.7 G/DL (ref 6–8.5)
RBC # BLD AUTO: 4.2 10*6/MM3 (ref 4.14–5.8)
SODIUM SERPL-SCNC: 144 MMOL/L (ref 136–145)
TRIGL SERPL-MCNC: 62 MG/DL (ref 0–150)
TSH SERPL DL<=0.05 MIU/L-ACNC: 0.35 UIU/ML (ref 0.27–4.2)
VLDLC SERPL-MCNC: 12 MG/DL (ref 5–40)
WBC NRBC COR # BLD AUTO: 5.11 10*3/MM3 (ref 3.4–10.8)

## 2024-05-02 PROCEDURE — 80061 LIPID PANEL: CPT

## 2024-05-02 PROCEDURE — 80050 GENERAL HEALTH PANEL: CPT

## 2024-05-02 PROCEDURE — 36415 COLL VENOUS BLD VENIPUNCTURE: CPT

## 2024-07-25 ENCOUNTER — TELEPHONE (OUTPATIENT)
Dept: GASTROENTEROLOGY | Facility: CLINIC | Age: 49
End: 2024-07-25
Payer: COMMERCIAL

## 2024-07-25 NOTE — TELEPHONE ENCOUNTER
----- Message from Caren Davalos sent at 7/24/2024  9:23 AM EDT -----  Regarding: FW: Appointment Request  Contact: [phone]  Would recommend follow-up with me in the next 3 to 4 weeks.  If he is having rectal bleeding he will need to schedule a colonoscopy at that time.  If bleeding gets worse recommend he go to the ER for immediate evaluation.  ----- Message -----  From: Jenelle Espinosa MA  Sent: 7/24/2024   8:32 AM EDT  To: SERAFIN Cano  Subject: FW: Appointment Request                            ----- Message -----  From: Claudette Guillen RegSched Rep  Sent: 7/24/2024   7:58 AM EDT  To: Jenelle Espinosa MA  Subject: FW: Appointment Request                          Please advise the time frame needed for a follow up appointment.  ----- Message -----  From: Deniz Calderon  Sent: 7/23/2024   8:02 PM EDT  To: [facility]  Subject: Appointment Request                              Appointment Request From: Deniz Calderon    With Provider: Caren Davalos [[facility]]    Preferred Date Range: 7/29/2024 – 8/2/2024    Preferred Times: Any Time    Reason for visit: Follow-up    Comments:  I have recently started noticing blood in my stool on multiple occasions. In addition, I am experiencing discomfort in my abdomen on a more common basis.

## 2024-07-25 NOTE — TELEPHONE ENCOUNTER
Patient called back and was notified of recommendations and was scheduled for an appointment. Patient verbalized understanding and will go to the ER if he has pain or worsening rectal bleeding.

## 2024-08-07 ENCOUNTER — OFFICE VISIT (OUTPATIENT)
Dept: GASTROENTEROLOGY | Facility: CLINIC | Age: 49
End: 2024-08-07
Payer: COMMERCIAL

## 2024-08-07 VITALS
BODY MASS INDEX: 31.4 KG/M2 | DIASTOLIC BLOOD PRESSURE: 78 MMHG | HEART RATE: 64 BPM | HEIGHT: 68 IN | WEIGHT: 207.2 LBS | SYSTOLIC BLOOD PRESSURE: 112 MMHG

## 2024-08-07 DIAGNOSIS — R19.4 CHANGE IN BOWEL HABITS: ICD-10-CM

## 2024-08-07 DIAGNOSIS — K62.5 RECTAL BLEEDING: Primary | ICD-10-CM

## 2024-08-07 DIAGNOSIS — Z83.79: ICD-10-CM

## 2024-08-07 DIAGNOSIS — R10.33 PERIUMBILICAL ABDOMINAL PAIN: ICD-10-CM

## 2024-08-07 DIAGNOSIS — K92.1 BLOOD IN STOOL: ICD-10-CM

## 2024-08-07 RX ORDER — SODIUM, POTASSIUM,MAG SULFATES 17.5-3.13G
2 SOLUTION, RECONSTITUTED, ORAL ORAL ONCE
Qty: 354 ML | Refills: 0 | Status: SHIPPED | OUTPATIENT
Start: 2024-08-07 | End: 2024-08-07

## 2024-08-07 NOTE — PROGRESS NOTES
Chief Complaint   Black or Bloody Stool and Abdominal Pain (LUQ)    History of Present Illness       Deniz Calderon is a 49 y.o. male who presents to [facility] for follow-up for blood in stool. He was last seen in the office by me on 1/30/24.     History of fatty liver disease.  FibroSure showed mild fatty liver with no fibrosis.  Most recent LFTs were normal.  He did have an episode a couple months ago where he had an elevated amylase and lipase level.  But since repeating those levels they are back to normal.     Had Truman Barr virus last year. Has lost a lot of weight 90-95 lbs in the past 2 years.  He has a history of Graves' disease with Raynaud's phenomenon.     He had a CT scan of the abdomen pelvis done on 4/25/2023 that showed no significant hepatic steatosis.  No suspicious liver lesion.  Liver is normal in size.  Very small fat-containing left.  Smaller than prior CT scan from March 2022.  Moderate amount of stool throughout the colon.     Recently had a Cologuard which was negative.     EGD in the past for reflux. Never had screening colonoscopy.      GI FH---Father with diverticulitis.            He admits since early July he has been having episodes of rectal bleeding and blood in the stool. He has had episodes of severe upper abd pain that was so bad he almost went to the ER> It subsided later that day. Some nausea with it but no vomiting. He denies any black stools. He can have loose stools but nothing out of the norm for him lately.   Results       Result Review :       CMP          5/2/2024    07:01   CMP   Glucose 82    BUN 24    Creatinine 1.30    EGFR 67.8    Sodium 144    Potassium 4.6    Chloride 107    Calcium 9.7    Total Protein 6.7    Albumin 4.4    Globulin 2.3    Total Bilirubin 0.4    Alkaline Phosphatase 63    AST (SGOT) 36    ALT (SGPT) 38    Albumin/Globulin Ratio 1.9    BUN/Creatinine Ratio 18.5    Anion Gap 13.9      CBC          5/2/2024     07:01   CBC   WBC 5.11    RBC 4.20    Hemoglobin 12.8    Hematocrit 39.6    MCV 94.3    MCH 30.5    MCHC 32.3    RDW 13.1    Platelets 244      CBC w/diff          5/2/2024    07:01   CBC w/Diff   WBC 5.11    RBC 4.20    Hemoglobin 12.8    Hematocrit 39.6    MCV 94.3    MCH 30.5    MCHC 32.3    RDW 13.1    Platelets 244    Neutrophil Rel % 63.9    Immature Granulocyte Rel % 0.2    Lymphocyte Rel % 26.8    Monocyte Rel % 6.1    Eosinophil Rel % 2.2    Basophil Rel % 0.8      Lipid Panel          5/2/2024    07:01   Lipid Panel   Total Cholesterol 187    Triglycerides 62    HDL Cholesterol 66    VLDL Cholesterol 12    LDL Cholesterol  109    LDL/HDL Ratio 1.65      TSH          5/2/2024    07:01   TSH   TSH 0.347                    Past Medical History       Past Medical History:   Diagnosis Date    Allergic     Fatty liver 3/2022    Indication with Truman Hale    GERD (gastroesophageal reflux disease)     GI (gastrointestinal bleed) 7/21    Noticed blood in stool    Headache     Hepatosplenomegaly 03/07/2022    Hyperlipidemia     Hypertension     Hypothyroidism     VILLANUEVA (nonalcoholic steatohepatitis) 09/09/2022    Obesity     Pancreatitis 4/2023    Not confirmed       Past Surgical History:   Procedure Laterality Date    COLONOSCOPY      Coloagurd         Current Outpatient Medications:     levothyroxine (SYNTHROID, LEVOTHROID) 150 MCG tablet, Take 1 tablet by mouth Daily., Disp: , Rfl:     multivitamin with minerals tablet tablet, Take 1 tablet by mouth Daily., Disp: , Rfl:     sodium-potassium-magnesium sulfates (SUPREP) 17.5-3.13-1.6 GM/177ML solution oral solution, Take 2 bottles by mouth 1 (One) Time for 1 dose. Take as directed, Disp: 354 mL, Rfl: 0     Allergies   Allergen Reactions    Penicillins Unknown - High Severity     Mild to severe reaction as a baby       Family History   Problem Relation Age of Onset    Parkinsonism Mother     Heart disease Father     Stroke Father     Hypertension Father      "Hyperlipidemia Father     Atrial fibrillation Father     No Known Problems Sister     No Known Problems Daughter     Parkinsonism Maternal Uncle     No Known Problems Maternal Grandmother     Thyroid disease Maternal Grandfather     Heart disease Paternal Grandmother     Heart disease Paternal Grandfather         Social History     Social History Narrative    Not on file       Objective       Review of Systems   Constitutional:  Negative for appetite change, fatigue, fever, unexpected weight gain and unexpected weight loss.   HENT:  Negative for trouble swallowing.    Respiratory:  Negative for cough, choking, chest tightness, shortness of breath, wheezing and stridor.    Cardiovascular:  Negative for chest pain, palpitations and leg swelling.   Gastrointestinal:  Positive for abdominal distention, abdominal pain, anal bleeding, blood in stool, diarrhea and nausea. Negative for constipation, rectal pain, vomiting, GERD and indigestion.        Vital Signs:   /78 (BP Location: Left arm, Patient Position: Sitting, Cuff Size: Adult)   Pulse 64   Ht 172.7 cm (68\")   Wt 94 kg (207 lb 3.2 oz)   BMI 31.50 kg/m²       Physical Exam  Constitutional:       General: He is not in acute distress.     Appearance: He is well-developed. He is not ill-appearing.   HENT:      Head: Normocephalic.   Eyes:      Pupils: Pupils are equal, round, and reactive to light.   Cardiovascular:      Rate and Rhythm: Normal rate and regular rhythm.      Heart sounds: Normal heart sounds.   Pulmonary:      Effort: Pulmonary effort is normal.      Breath sounds: Normal breath sounds.   Abdominal:      General: Bowel sounds are normal. There is distension.      Palpations: Abdomen is soft. There is no mass.      Tenderness: There is abdominal tenderness. There is no guarding or rebound.      Hernia: No hernia is present.   Musculoskeletal:         General: Normal range of motion.   Skin:     General: Skin is warm and dry.   Neurological:     "  Mental Status: He is alert and oriented to person, place, and time.   Psychiatric:         Speech: Speech normal.         Behavior: Behavior normal.         Judgment: Judgment normal.           Assessment & Plan          Assessment and Plan    Diagnoses and all orders for this visit:    1. Rectal bleeding (Primary)  -     Case Request; Standing  -     Follow Anesthesia Guidelines / Protocol; Future  -     Obtain Informed Consent; Future  -     sodium-potassium-magnesium sulfates (SUPREP) 17.5-3.13-1.6 GM/177ML solution oral solution; Take 2 bottles by mouth 1 (One) Time for 1 dose. Take as directed  Dispense: 354 mL; Refill: 0  -     Case Request    2. Blood in stool  -     Case Request; Standing  -     Follow Anesthesia Guidelines / Protocol; Future  -     Obtain Informed Consent; Future  -     sodium-potassium-magnesium sulfates (SUPREP) 17.5-3.13-1.6 GM/177ML solution oral solution; Take 2 bottles by mouth 1 (One) Time for 1 dose. Take as directed  Dispense: 354 mL; Refill: 0  -     Case Request    3. Periumbilical abdominal pain  -     Case Request; Standing  -     Follow Anesthesia Guidelines / Protocol; Future  -     Obtain Informed Consent; Future  -     sodium-potassium-magnesium sulfates (SUPREP) 17.5-3.13-1.6 GM/177ML solution oral solution; Take 2 bottles by mouth 1 (One) Time for 1 dose. Take as directed  Dispense: 354 mL; Refill: 0  -     Case Request    4. Change in bowel habits  -     Case Request; Standing  -     Follow Anesthesia Guidelines / Protocol; Future  -     Obtain Informed Consent; Future  -     sodium-potassium-magnesium sulfates (SUPREP) 17.5-3.13-1.6 GM/177ML solution oral solution; Take 2 bottles by mouth 1 (One) Time for 1 dose. Take as directed  Dispense: 354 mL; Refill: 0  -     Case Request    5. FH: diverticulitis  -     Case Request; Standing  -     Follow Anesthesia Guidelines / Protocol; Future  -     Obtain Informed Consent; Future  -     sodium-potassium-magnesium sulfates  (SUPREP) 17.5-3.13-1.6 GM/177ML solution oral solution; Take 2 bottles by mouth 1 (One) Time for 1 dose. Take as directed  Dispense: 354 mL; Refill: 0  -     Case Request    Other orders  -     Follow Anesthesia Guidelines / Protocol; Standing  -     Verify NPO; Standing  -     Verify Bowel Prep Was Successful; Standing  -     Give Tap Water Enema If Bowel Prep Insufficient; Standing  -     Obtain Informed Consent; Standing      Reviewed medical history with him today.  Given his history and current symptoms recommend colonoscopy with Dr. Dawson for further evaluation.  Patient is agreeable to the scope.  I do worry that he has had a diverticular bleed?  Recommend for now that he stops popcorn, corn and peanuts.  Continue a high-fiber diet.  Patient to call the office with any issues.  Patient to follow-up with me after his scope.  Patient is agreeable to the plan.    No blood thinners, no clearances.  Suprep.Surgical Risk and Benefits discussed: Possible risks/complications, benefits, and alternatives to surgical or invasive procedure have been explained to patient and/or legal guardian; risks include bleeding, infection, and perforation. Patient has been evaluated and can tolerate anesthesia and/or sedation. Risks, benefits, and alternatives to anesthesia and sedation have been explained to patient and/or legal guardian.          Follow Up       Follow Up   Return for F/U AFTER PROCEDURE.  Patient was given instructions and counseling regarding his condition or for health maintenance advice. Please see specific information pulled into the AVS if appropriate.

## 2024-09-11 ENCOUNTER — ANESTHESIA EVENT (OUTPATIENT)
Dept: GASTROENTEROLOGY | Facility: HOSPITAL | Age: 49
End: 2024-09-11
Payer: COMMERCIAL

## 2024-09-11 NOTE — ANESTHESIA PREPROCEDURE EVALUATION
Anesthesia Evaluation     Patient summary reviewed and Nursing notes reviewed   NPO Solid Status: > 8 hours  NPO Liquid Status: > 4 hours           Airway   Mallampati: II  TM distance: >3 FB  Neck ROM: full  No difficulty expected  Comment: Full beard   Dental - normal exam     Pulmonary - normal exam   (+) a smoker Former,  Cardiovascular - normal exam    (+) hypertension, hyperlipidemia      Neuro/Psych  (+) headaches  GI/Hepatic/Renal/Endo    (+) obesity, GERD, GI bleeding , hepatitis, liver disease fatty liver disease, renal disease- CRI, thyroid problem hypothyroidism    Musculoskeletal     Abdominal    Substance History      OB/GYN          Other                      Anesthesia Plan    ASA 2     general   total IV anesthesia  (Total IV Anesthesia    Patient understands anesthesia not responsible for dental damage.  )  intravenous induction     Anesthetic plan, risks, benefits, and alternatives have been provided, discussed and informed consent has been obtained with: patient.  Pre-procedure education provided  Use of blood products discussed with patient  Consented to blood products.    Plan discussed with CRNA.      CODE STATUS:

## 2024-09-12 ENCOUNTER — ANESTHESIA (OUTPATIENT)
Dept: GASTROENTEROLOGY | Facility: HOSPITAL | Age: 49
End: 2024-09-12
Payer: COMMERCIAL

## 2024-09-12 ENCOUNTER — HOSPITAL ENCOUNTER (OUTPATIENT)
Facility: HOSPITAL | Age: 49
Setting detail: HOSPITAL OUTPATIENT SURGERY
Discharge: HOME | End: 2024-09-12
Attending: INTERNAL MEDICINE | Admitting: INTERNAL MEDICINE
Payer: COMMERCIAL

## 2024-09-12 VITALS
HEART RATE: 61 BPM | SYSTOLIC BLOOD PRESSURE: 126 MMHG | TEMPERATURE: 97.8 F | OXYGEN SATURATION: 100 % | WEIGHT: 199.3 LBS | RESPIRATION RATE: 16 BRPM | DIASTOLIC BLOOD PRESSURE: 77 MMHG | HEIGHT: 69 IN | BODY MASS INDEX: 29.52 KG/M2

## 2024-09-12 DIAGNOSIS — R10.33 PERIUMBILICAL ABDOMINAL PAIN: ICD-10-CM

## 2024-09-12 DIAGNOSIS — Z83.79: ICD-10-CM

## 2024-09-12 DIAGNOSIS — K92.1 BLOOD IN STOOL: ICD-10-CM

## 2024-09-12 DIAGNOSIS — R19.4 CHANGE IN BOWEL HABITS: ICD-10-CM

## 2024-09-12 DIAGNOSIS — K62.5 RECTAL BLEEDING: ICD-10-CM

## 2024-09-12 PROCEDURE — 88305 TISSUE EXAM BY PATHOLOGIST: CPT | Performed by: INTERNAL MEDICINE

## 2024-09-12 PROCEDURE — 25810000003 LACTATED RINGERS PER 1000 ML

## 2024-09-12 PROCEDURE — 25810000003 LACTATED RINGERS PER 1000 ML: Performed by: NURSE ANESTHETIST, CERTIFIED REGISTERED

## 2024-09-12 PROCEDURE — 25010000002 PROPOFOL 10 MG/ML EMULSION: Performed by: NURSE ANESTHETIST, CERTIFIED REGISTERED

## 2024-09-12 PROCEDURE — 45385 COLONOSCOPY W/LESION REMOVAL: CPT | Performed by: INTERNAL MEDICINE

## 2024-09-12 RX ORDER — SODIUM CHLORIDE, SODIUM LACTATE, POTASSIUM CHLORIDE, CALCIUM CHLORIDE 600; 310; 30; 20 MG/100ML; MG/100ML; MG/100ML; MG/100ML
30 INJECTION, SOLUTION INTRAVENOUS CONTINUOUS
Status: DISCONTINUED | OUTPATIENT
Start: 2024-09-12 | End: 2024-09-12 | Stop reason: HOSPADM

## 2024-09-12 RX ORDER — SODIUM CHLORIDE, SODIUM LACTATE, POTASSIUM CHLORIDE, CALCIUM CHLORIDE 600; 310; 30; 20 MG/100ML; MG/100ML; MG/100ML; MG/100ML
INJECTION, SOLUTION INTRAVENOUS CONTINUOUS PRN
Status: DISCONTINUED | OUTPATIENT
Start: 2024-09-12 | End: 2024-09-12 | Stop reason: SURG

## 2024-09-12 RX ORDER — LIDOCAINE HYDROCHLORIDE 20 MG/ML
INJECTION, SOLUTION EPIDURAL; INFILTRATION; INTRACAUDAL; PERINEURAL AS NEEDED
Status: DISCONTINUED | OUTPATIENT
Start: 2024-09-12 | End: 2024-09-12 | Stop reason: SURG

## 2024-09-12 RX ORDER — PROPOFOL 10 MG/ML
VIAL (ML) INTRAVENOUS AS NEEDED
Status: DISCONTINUED | OUTPATIENT
Start: 2024-09-12 | End: 2024-09-12 | Stop reason: SURG

## 2024-09-12 RX ADMIN — PROPOFOL 50 MG: 10 INJECTION, EMULSION INTRAVENOUS at 08:34

## 2024-09-12 RX ADMIN — LIDOCAINE HYDROCHLORIDE 100 MG: 20 INJECTION, SOLUTION INTRAVENOUS at 08:34

## 2024-09-12 RX ADMIN — SODIUM CHLORIDE, POTASSIUM CHLORIDE, SODIUM LACTATE AND CALCIUM CHLORIDE: 600; 310; 30; 20 INJECTION, SOLUTION INTRAVENOUS at 08:32

## 2024-09-12 RX ADMIN — SODIUM CHLORIDE, POTASSIUM CHLORIDE, SODIUM LACTATE AND CALCIUM CHLORIDE 30 ML/HR: 600; 310; 30; 20 INJECTION, SOLUTION INTRAVENOUS at 07:29

## 2024-09-12 RX ADMIN — PROPOFOL 333 MCG/KG/MIN: 10 INJECTION, EMULSION INTRAVENOUS at 08:34

## 2024-09-12 NOTE — H&P
Pre Procedure History & Physical    Chief Complaint:   Rectal bleeding, periumbilical pain    Subjective     HPI:   50 yo M here for eval of rectal bleeding, periumbilical pain.    Past Medical History:   Past Medical History:   Diagnosis Date    Allergic     Fatty liver 3/2022    Indication with Truman Hale    GERD (gastroesophageal reflux disease)     GI (gastrointestinal bleed) 7/21    Noticed blood in stool    Headache     Hepatosplenomegaly 03/07/2022    Hyperlipidemia     Hypertension     Hypothyroidism     VILLANUEVA (nonalcoholic steatohepatitis) 09/09/2022    Obesity     Pancreatitis 4/2023    Not confirmed       Past Surgical History:  Past Surgical History:   Procedure Laterality Date    ENDOSCOPY      WISDOM TOOTH EXTRACTION         Family History:  Family History   Problem Relation Age of Onset    Parkinsonism Mother     Heart disease Father     Stroke Father     Hypertension Father     Hyperlipidemia Father     Atrial fibrillation Father     No Known Problems Sister     No Known Problems Daughter     Parkinsonism Maternal Uncle     No Known Problems Maternal Grandmother     Thyroid disease Maternal Grandfather     Heart disease Paternal Grandmother     Heart disease Paternal Grandfather        Social History:   reports that he quit smoking about 27 years ago. His smoking use included cigarettes. He started smoking about 31 years ago. He has a 6 pack-year smoking history. He has never used smokeless tobacco. He reports current alcohol use of about 3.0 standard drinks of alcohol per week. He reports that he does not use drugs.    Medications:   Medications Prior to Admission   Medication Sig Dispense Refill Last Dose    levothyroxine (SYNTHROID, LEVOTHROID) 150 MCG tablet Take 1 tablet by mouth Daily.   9/12/2024    multivitamin with minerals tablet tablet Take 1 tablet by mouth Daily.   9/11/2024       Allergies:  Penicillins    ROS:    Pertinent items are noted in HPI     Objective     Blood pressure  "115/76, pulse 54, temperature 97.1 °F (36.2 °C), temperature source Temporal, resp. rate 16, height 175.3 cm (69\"), weight 90.4 kg (199 lb 4.7 oz), SpO2 98%.    Physical Exam   Constitutional: Pt is oriented to person, place, and time and well-developed, well-nourished, and in no distress.   Mouth/Throat: Oropharynx is clear and moist.   Neck: Normal range of motion.   Cardiovascular: Normal rate, regular rhythm and normal heart sounds.    Pulmonary/Chest: Effort normal and breath sounds normal.   Abdominal: Soft. Nontender  Skin: Skin is warm and dry.   Psychiatric: Mood, memory, affect and judgment normal.     Assessment & Plan     Diagnosis:  Rectal bleeding, periumbilical pain    Anticipated Surgical Procedure:  Colonoscopy    The risks, benefits, and alternatives of this procedure have been discussed with the patient or the responsible party- the patient understands and agrees to proceed.           "

## 2024-09-12 NOTE — ANESTHESIA POSTPROCEDURE EVALUATION
Patient: Deniz Calderon    Procedure Summary       Date: 09/12/24 Room / Location: [facility] ENDOSCOPY 1 / [facility] ENDOSCOPY    Anesthesia Start: 0832 Anesthesia Stop: 0904    Procedure: COLONOSCOPY with cold/hot snare polyectomy with endo clip x1 Diagnosis:       Rectal bleeding      Blood in stool      Periumbilical abdominal pain      Change in bowel habits      FH: diverticulitis      (Rectal bleeding [K62.5])      (Blood in stool [K92.1])      (Periumbilical abdominal pain [R10.33])      (Change in bowel habits [R19.4])      (FH: diverticulitis [Z83.79])    Surgeons: Arabella Dawson MD Provider: Ramila Ralph CRNA    Anesthesia Type: general ASA Status: 2            Anesthesia Type: general    Vitals  Vitals Value Taken Time   /70 09/12/24 0901   Temp 36.2 °C (97.2 °F) 09/12/24 0901   Pulse 51 09/12/24 0904   Resp 13 09/12/24 0901   SpO2 100 % 09/12/24 0904   Vitals shown include unfiled device data.        Post Anesthesia Care and Evaluation    Post-procedure mental status: acceptable.  Pain management: satisfactory to patient    Airway patency: patent  Anesthetic complications: No anesthetic complications    Cardiovascular status: acceptable  Respiratory status: acceptable    Comments: Per chart review

## 2024-09-13 LAB
CYTO UR: NORMAL
LAB AP CASE REPORT: NORMAL
LAB AP CLINICAL INFORMATION: NORMAL
PATH REPORT.FINAL DX SPEC: NORMAL
PATH REPORT.GROSS SPEC: NORMAL

## 2024-10-09 ENCOUNTER — TELEPHONE (OUTPATIENT)
Dept: GASTROENTEROLOGY | Facility: CLINIC | Age: 49
End: 2024-10-09
Payer: COMMERCIAL

## 2024-10-09 NOTE — TELEPHONE ENCOUNTER
Patient called for results of colonoscopy biopsies, per note biopsies benign & repeat colonoscopy in 3 years. Advised patient & he verbalized understanding.

## 2024-10-30 NOTE — PROGRESS NOTES
Chief Complaint  Follow-up (49 year old male here today for a 6 month follow up)    History of Present Illness  SUBJECTIVE  Deniz Calderon presents to [facility] for a 6 month follow ups.     Patient states that he had intermittent right triceps pain that radiated to his shoulder blade. He states it was dull/nagging. Denies any known injury. He states this started last week. He denies any pain today. He states that he could have pulled a muscle.     He states that he does have some mild groin pain. He does ride the [employer] daily. Denies any bulging, dysuria, n/v.    He is doing well overall.    He denies any chest pain, soa, palpitations, nausea, vomiting, diarrhea, changes to bowel/bladder habits.    Past Medical History:   Diagnosis Date    Allergic     Fatty liver 3/2022    Indication with Truman Hale    GERD (gastroesophageal reflux disease)     GI (gastrointestinal bleed) 7/21    Noticed blood in stool    Headache     Hepatosplenomegaly 03/07/2022    Hyperlipidemia     Hypertension     Hypothyroidism     VILLANUEVA (nonalcoholic steatohepatitis) 09/09/2022    Obesity     Pancreatitis 4/2023    Not confirmed      Family History   Problem Relation Age of Onset    Parkinsonism Mother     Heart disease Father     Stroke Father     Hypertension Father     Hyperlipidemia Father     Atrial fibrillation Father     No Known Problems Sister     No Known Problems Daughter     Parkinsonism Maternal Uncle     No Known Problems Maternal Grandmother     Thyroid disease Maternal Grandfather     Heart disease Paternal Grandmother     Heart disease Paternal Grandfather       Past Surgical History:   Procedure Laterality Date    COLONOSCOPY N/A 9/12/2024    Procedure: COLONOSCOPY with cold/hot snare polyectomy with endo clip x1;  Surgeon: Arabella Dawson MD;  Location: [facility] ENDOSCOPY;  Service: Gastroenterology;  Laterality: N/A;  colon polyps, endo clipx1, diverticulosis    ENDOSCOPY       "WISDOM TOOTH EXTRACTION          Current Outpatient Medications:     levothyroxine (SYNTHROID, LEVOTHROID) 150 MCG tablet, Take 1 tablet by mouth Daily., Disp: , Rfl:     multivitamin with minerals tablet tablet, Take 1 tablet by mouth Daily., Disp: , Rfl:     OBJECTIVE  Vital Signs:   /82 (BP Location: Left arm, Patient Position: Sitting)   Pulse 70   Temp 97.3 °F (36.3 °C)   Resp 18   Ht 175.3 cm (69\")   Wt 93 kg (205 lb)   SpO2 98%   BMI 30.27 kg/m²    Estimated body mass index is 30.27 kg/m² as calculated from the following:    Height as of this encounter: 175.3 cm (69\").    Weight as of this encounter: 93 kg (205 lb).     Wt Readings from Last 3 Encounters:   11/01/24 93 kg (205 lb)   09/12/24 90.4 kg (199 lb 4.7 oz)   08/07/24 94 kg (207 lb 3.2 oz)     BP Readings from Last 3 Encounters:   11/01/24 126/82   09/12/24 126/77   08/07/24 112/78       Physical Exam  Vitals and nursing note reviewed.   Constitutional:       Appearance: Normal appearance.   HENT:      Head: Normocephalic.   Eyes:      Extraocular Movements: Extraocular movements intact.      Conjunctiva/sclera: Conjunctivae normal.   Cardiovascular:      Rate and Rhythm: Normal rate and regular rhythm.      Heart sounds: Normal heart sounds. No murmur heard.  Pulmonary:      Effort: Pulmonary effort is normal.      Breath sounds: Normal breath sounds. No wheezing or rales.   Abdominal:      General: Bowel sounds are normal.      Palpations: Abdomen is soft.      Tenderness: There is no abdominal tenderness. There is no guarding.   Musculoskeletal:         General: No swelling. Normal range of motion.      Cervical back: Normal range of motion and neck supple.   Skin:     General: Skin is warm and dry.   Neurological:      General: No focal deficit present.      Mental Status: He is alert and oriented to person, place, and time. Mental status is at baseline.   Psychiatric:         Mood and Affect: Mood normal.         Behavior: Behavior " normal.         Thought Content: Thought content normal.         Judgment: Judgment normal.          Result Review        No Images in the past 120 days found..     The above data has been reviewed by SERAFIN Dugan 11/01/2024 11:51 EDT.          Patient Care Team:  Lin Blevins APRN as PCP - General (Internal Medicine)  Caren Davalos APRN as Nurse Practitioner (Nurse Practitioner)            ASSESSMENT & PLAN    Diagnoses and all orders for this visit:    1. Renal insufficiency (Primary)  Comments:  mild decrease in gfr/cr over the last year, denies any supplementation use. he denies nsaids use. will monitor labs.  Orders:  -     Comprehensive metabolic panel; Future  -     CBC w AUTO Differential; Future  -     Comprehensive Metabolic Panel; Future  -     Lipid Panel; Future  -     TSH Rfx On Abnormal To Free T4; Future  -     Vitamin B12 & Folate; Future    2. Mixed hyperlipidemia  Assessment & Plan:  Stable last time checked  Will recheck at next OV     Orders:  -     CBC & Differential; Future  -     Comprehensive Metabolic Panel; Future  -     Lipid Panel; Future  -     TSH Rfx On Abnormal To Free T4; Future  -     Vitamin B12 & Folate; Future    3. VILLANUEVA (nonalcoholic steatohepatitis)  Assessment & Plan:  History of elevated LFT's with VILLANUEVA diagnosis following EBV infection.  He is followed by gastroenterology.  He has lost a significant amount of weight and last labs were significantly improved.  Labs are continuing to remain stable  Continue with lifestyle modifications.     Orders:  -     CBC & Differential; Future  -     Comprehensive Metabolic Panel; Future  -     Lipid Panel; Future  -     TSH Rfx On Abnormal To Free T4; Future  -     Vitamin B12 & Folate; Future    4. Muscle strain  Comments:  recommend ice, rom exercises-rtc if symptoms worsen         Tobacco Use: Medium Risk (11/1/2024)    Patient History     Smoking Tobacco Use: Former     Smokeless Tobacco Use: Never     Passive  Exposure: Not on file       Follow Up     Return in about 6 months (around 5/1/2025) for Annual physical.    Please note that portions of this note were completed with a voice recognition program.    Patient was given instructions and counseling regarding his condition or for health maintenance advice. Please see specific information pulled into the AVS if appropriate.   I have reviewed information obtained and documented by others and I have confirmed the accuracy of this documented note.    SERAFIN Dugan

## 2024-11-01 ENCOUNTER — OFFICE VISIT (OUTPATIENT)
Dept: INTERNAL MEDICINE | Age: 49
End: 2024-11-01
Payer: COMMERCIAL

## 2024-11-01 VITALS
HEART RATE: 70 BPM | DIASTOLIC BLOOD PRESSURE: 82 MMHG | OXYGEN SATURATION: 98 % | BODY MASS INDEX: 30.36 KG/M2 | HEIGHT: 69 IN | TEMPERATURE: 97.3 F | RESPIRATION RATE: 18 BRPM | SYSTOLIC BLOOD PRESSURE: 126 MMHG | WEIGHT: 205 LBS

## 2024-11-01 DIAGNOSIS — N28.9 RENAL INSUFFICIENCY: Primary | ICD-10-CM

## 2024-11-01 DIAGNOSIS — T14.8XXA MUSCLE STRAIN: ICD-10-CM

## 2024-11-01 DIAGNOSIS — E78.2 MIXED HYPERLIPIDEMIA: ICD-10-CM

## 2024-11-01 DIAGNOSIS — K75.81 NASH (NONALCOHOLIC STEATOHEPATITIS): ICD-10-CM

## 2024-11-01 NOTE — ASSESSMENT & PLAN NOTE
History of elevated LFT's with VILLANUEVA diagnosis following EBV infection.  He is followed by gastroenterology.  He has lost a significant amount of weight and last labs were significantly improved.  Labs are continuing to remain stable  Continue with lifestyle modifications.

## 2024-11-27 ENCOUNTER — LAB (OUTPATIENT)
Dept: INTERNAL MEDICINE | Age: 49
End: 2024-11-27
Payer: COMMERCIAL

## 2024-11-27 ENCOUNTER — TELEPHONE (OUTPATIENT)
Dept: INTERNAL MEDICINE | Age: 49
End: 2024-11-27

## 2024-11-27 ENCOUNTER — OFFICE VISIT (OUTPATIENT)
Dept: GASTROENTEROLOGY | Facility: CLINIC | Age: 49
End: 2024-11-27
Payer: COMMERCIAL

## 2024-11-27 VITALS
HEIGHT: 68 IN | BODY MASS INDEX: 31.01 KG/M2 | SYSTOLIC BLOOD PRESSURE: 124 MMHG | HEART RATE: 50 BPM | WEIGHT: 204.6 LBS | DIASTOLIC BLOOD PRESSURE: 80 MMHG

## 2024-11-27 DIAGNOSIS — K57.90 DIVERTICULOSIS: ICD-10-CM

## 2024-11-27 DIAGNOSIS — Z86.0101 HISTORY OF ADENOMATOUS POLYP OF COLON: ICD-10-CM

## 2024-11-27 DIAGNOSIS — K76.0 FATTY LIVER: Primary | ICD-10-CM

## 2024-11-27 DIAGNOSIS — N28.9 RENAL INSUFFICIENCY: ICD-10-CM

## 2024-11-27 DIAGNOSIS — Z12.83 SCREENING EXAM FOR SKIN CANCER: Primary | ICD-10-CM

## 2024-11-27 LAB
ALBUMIN SERPL-MCNC: 4.6 G/DL (ref 3.5–5.2)
ALBUMIN/GLOB SERPL: 1.6 G/DL
ALP SERPL-CCNC: 69 U/L (ref 39–117)
ALT SERPL W P-5'-P-CCNC: 22 U/L (ref 1–41)
ANION GAP SERPL CALCULATED.3IONS-SCNC: 10.6 MMOL/L (ref 5–15)
AST SERPL-CCNC: 33 U/L (ref 1–40)
BASOPHILS # BLD AUTO: 0.07 10*3/MM3 (ref 0–0.2)
BASOPHILS NFR BLD AUTO: 1.1 % (ref 0–1.5)
BILIRUB SERPL-MCNC: 0.4 MG/DL (ref 0–1.2)
BUN SERPL-MCNC: 15 MG/DL (ref 6–20)
BUN/CREAT SERPL: 12.4 (ref 7–25)
CALCIUM SPEC-SCNC: 9.8 MG/DL (ref 8.6–10.5)
CHLORIDE SERPL-SCNC: 104 MMOL/L (ref 98–107)
CO2 SERPL-SCNC: 24.4 MMOL/L (ref 22–29)
CREAT SERPL-MCNC: 1.21 MG/DL (ref 0.76–1.27)
DEPRECATED RDW RBC AUTO: 42.3 FL (ref 37–54)
EGFRCR SERPLBLD CKD-EPI 2021: 73.9 ML/MIN/1.73
EOSINOPHIL # BLD AUTO: 0.23 10*3/MM3 (ref 0–0.4)
EOSINOPHIL NFR BLD AUTO: 3.8 % (ref 0.3–6.2)
ERYTHROCYTE [DISTWIDTH] IN BLOOD BY AUTOMATED COUNT: 12.4 % (ref 12.3–15.4)
GLOBULIN UR ELPH-MCNC: 2.8 GM/DL
GLUCOSE SERPL-MCNC: 84 MG/DL (ref 65–99)
HCT VFR BLD AUTO: 41.3 % (ref 37.5–51)
HGB BLD-MCNC: 14 G/DL (ref 13–17.7)
IMM GRANULOCYTES # BLD AUTO: 0.01 10*3/MM3 (ref 0–0.05)
IMM GRANULOCYTES NFR BLD AUTO: 0.2 % (ref 0–0.5)
LYMPHOCYTES # BLD AUTO: 2.09 10*3/MM3 (ref 0.7–3.1)
LYMPHOCYTES NFR BLD AUTO: 34.2 % (ref 19.6–45.3)
MCH RBC QN AUTO: 31.7 PG (ref 26.6–33)
MCHC RBC AUTO-ENTMCNC: 33.9 G/DL (ref 31.5–35.7)
MCV RBC AUTO: 93.7 FL (ref 79–97)
MONOCYTES # BLD AUTO: 0.4 10*3/MM3 (ref 0.1–0.9)
MONOCYTES NFR BLD AUTO: 6.5 % (ref 5–12)
NEUTROPHILS NFR BLD AUTO: 3.32 10*3/MM3 (ref 1.7–7)
NEUTROPHILS NFR BLD AUTO: 54.2 % (ref 42.7–76)
NRBC BLD AUTO-RTO: 0 /100 WBC (ref 0–0.2)
PLATELET # BLD AUTO: 274 10*3/MM3 (ref 140–450)
PMV BLD AUTO: 9.6 FL (ref 6–12)
POTASSIUM SERPL-SCNC: 5 MMOL/L (ref 3.5–5.2)
PROT SERPL-MCNC: 7.4 G/DL (ref 6–8.5)
RBC # BLD AUTO: 4.41 10*6/MM3 (ref 4.14–5.8)
SODIUM SERPL-SCNC: 139 MMOL/L (ref 136–145)
WBC NRBC COR # BLD AUTO: 6.12 10*3/MM3 (ref 3.4–10.8)

## 2024-11-27 PROCEDURE — 36415 COLL VENOUS BLD VENIPUNCTURE: CPT

## 2024-11-27 PROCEDURE — 99214 OFFICE O/P EST MOD 30 MIN: CPT | Performed by: NURSE PRACTITIONER

## 2024-11-27 PROCEDURE — 80053 COMPREHEN METABOLIC PANEL: CPT

## 2024-11-27 PROCEDURE — 85025 COMPLETE CBC W/AUTO DIFF WBC: CPT

## 2024-11-27 NOTE — PROGRESS NOTES
Chief Complaint   No chief complaint on file.    History of Present Illness       Deniz Calderon is a 49 y.o. male who presents to [facility] for follow-up for fatty liver. He was last seen in the office by me on 8/7/2024.    History of fatty liver disease.  FibroSure showed mild fatty liver with no fibrosis.  Most recent LFTs were normal.  He did have an episode a couple months ago where he had an elevated amylase and lipase level.  But since repeating those levels they are back to normal.     Had Truman Barr virus last year. Has lost a lot of weight 90-95 lbs in the past 2 years.  He has a history of Graves' disease with Raynaud's phenomenon.     He had a CT scan of the abdomen pelvis done on 4/25/2023 that showed no significant hepatic steatosis.  No suspicious liver lesion.  Liver is normal in size.  Very small fat-containing left.  Smaller than prior CT scan from March 2022.  Moderate amount of stool throughout the colon.     Recently had a Cologuard which was negative.     EGD in the past for reflux. Never had screening colonoscopy.      GI FH---Father with diverticulitis.            He underwent colonoscopy Dr. Dawson on 9/12/2024 for rectal bleeding and abdominal pain.  Colonoscopy showed a 4 mm ascending colon polyp which was removed, 5 mm sigmoid colon polyp which was removed, 10 mm sigmoid colon polyp which was removed and mild diverticulosis.  Repeat colonoscopy in 3 years.  Path positive for tubular adenoma.    Denies any rectal bleeding or abd pain since last visit.  Bowels back to normal.  Results       Result Review :       CMP          5/2/2024    07:01   CMP   Glucose 82    BUN 24    Creatinine 1.30    EGFR 67.8    Sodium 144    Potassium 4.6    Chloride 107    Calcium 9.7    Total Protein 6.7    Albumin 4.4    Globulin 2.3    Total Bilirubin 0.4    Alkaline Phosphatase 63    AST (SGOT) 36    ALT (SGPT) 38    Albumin/Globulin Ratio 1.9    BUN/Creatinine Ratio  18.5    Anion Gap 13.9      CBC          5/2/2024    07:01   CBC   WBC 5.11    RBC 4.20    Hemoglobin 12.8    Hematocrit 39.6    MCV 94.3    MCH 30.5    MCHC 32.3    RDW 13.1    Platelets 244      CBC w/diff          5/2/2024    07:01   CBC w/Diff   WBC 5.11    RBC 4.20    Hemoglobin 12.8    Hematocrit 39.6    MCV 94.3    MCH 30.5    MCHC 32.3    RDW 13.1    Platelets 244    Neutrophil Rel % 63.9    Immature Granulocyte Rel % 0.2    Lymphocyte Rel % 26.8    Monocyte Rel % 6.1    Eosinophil Rel % 2.2    Basophil Rel % 0.8      Lipid Panel          5/2/2024    07:01   Lipid Panel   Total Cholesterol 187    Triglycerides 62    HDL Cholesterol 66    VLDL Cholesterol 12    LDL Cholesterol  109    LDL/HDL Ratio 1.65      TSH          5/2/2024    07:01   TSH   TSH 0.347        Lipase   Lipase   Date Value Ref Range Status   04/20/2023 45 13 - 60 U/L Final     Amylase   Amylase   Date Value Ref Range Status   04/20/2023 79 28 - 100 U/L Final     Iron Profile   Iron   Date Value Ref Range Status   04/06/2022 144 59 - 158 mcg/dL Final     TIBC   Date Value Ref Range Status   04/06/2022 362 298 - 536 mcg/dL Final     Iron Saturation (TSAT)   Date Value Ref Range Status   04/06/2022 40 20 - 50 % Final     Transferrin   Date Value Ref Range Status   04/06/2022 243 200 - 360 mg/dL Final     Ferritin   Ferritin   Date Value Ref Range Status   04/06/2022 94.50 30.00 - 400.00 ng/mL Final               Past Medical History       Past Medical History:   Diagnosis Date    Allergic     Fatty liver 3/2022    Indication with Truman Hale    GERD (gastroesophageal reflux disease)     GI (gastrointestinal bleed) 7/21    Noticed blood in stool    Headache     Hepatosplenomegaly 03/07/2022    Hyperlipidemia     Hypertension     Hypothyroidism     VILLANUEVA (nonalcoholic steatohepatitis) 09/09/2022    Obesity     Pancreatitis 4/2023    Not confirmed       Past Surgical History:   Procedure Laterality Date    COLONOSCOPY N/A 9/12/2024    Procedure:  "COLONOSCOPY with cold/hot snare polyectomy with endo clip x1;  Surgeon: Arabella Dawson MD;  Location: [facility] ENDOSCOPY;  Service: Gastroenterology;  Laterality: N/A;  colon polyps, endo clipx1, diverticulosis    ENDOSCOPY      WISDOM TOOTH EXTRACTION           Current Outpatient Medications:     levothyroxine (SYNTHROID, LEVOTHROID) 150 MCG tablet, Take 1 tablet by mouth Daily., Disp: , Rfl:     multivitamin with minerals tablet tablet, Take 1 tablet by mouth Daily., Disp: , Rfl:      Allergies   Allergen Reactions    Penicillins Unknown - High Severity     Mild to severe reaction as a baby       Family History   Problem Relation Age of Onset    Parkinsonism Mother     Heart disease Father     Stroke Father     Hypertension Father     Hyperlipidemia Father     Atrial fibrillation Father     No Known Problems Sister     No Known Problems Daughter     Parkinsonism Maternal Uncle     No Known Problems Maternal Grandmother     Thyroid disease Maternal Grandfather     Heart disease Paternal Grandmother     Heart disease Paternal Grandfather         Social History     Social History Narrative    Not on file       Objective       Review of Systems   Constitutional:  Negative for appetite change, fatigue, fever, unexpected weight gain and unexpected weight loss.   HENT:  Negative for trouble swallowing.    Respiratory:  Negative for cough, choking, chest tightness, shortness of breath, wheezing and stridor.    Cardiovascular:  Negative for chest pain, palpitations and leg swelling.   Gastrointestinal:  Negative for abdominal distention, abdominal pain, anal bleeding, blood in stool, constipation, diarrhea, nausea, rectal pain, vomiting, GERD and indigestion.        Vital Signs:   /80 (BP Location: Left arm, Patient Position: Sitting, Cuff Size: Adult)   Pulse 50   Ht 172.7 cm (68\")   Wt 92.8 kg (204 lb 9.6 oz)   BMI 31.11 kg/m²       Physical Exam  Constitutional:       General: He is not in acute " distress.     Appearance: He is well-developed. He is not ill-appearing.   HENT:      Head: Normocephalic.   Eyes:      Pupils: Pupils are equal, round, and reactive to light.   Cardiovascular:      Rate and Rhythm: Normal rate and regular rhythm.      Heart sounds: Normal heart sounds.   Pulmonary:      Effort: Pulmonary effort is normal.      Breath sounds: Normal breath sounds.   Abdominal:      General: Bowel sounds are normal. There is no distension.      Palpations: Abdomen is soft. There is no mass.      Tenderness: There is no abdominal tenderness. There is no guarding or rebound.      Hernia: No hernia is present.   Musculoskeletal:         General: Normal range of motion.   Skin:     General: Skin is warm and dry.   Neurological:      Mental Status: He is alert and oriented to person, place, and time.   Psychiatric:         Speech: Speech normal.         Behavior: Behavior normal.         Judgment: Judgment normal.           Assessment & Plan          Assessment and Plan    Diagnoses and all orders for this visit:    1. Fatty liver (Primary)    2. Diverticulosis    3. History of adenomatous polyp of colon      Reviewed colonoscopy results with him today.  Will repeat colonoscopy in 3 years.  Recommend a high-fiber diet given history of diverticulosis.  Most recent LFTs were good.  Patient to get labs redone by PCP this week.  Bowels moving well.  No rectal bleeding or abdominal pain.  Overall he is doing well.  Patient to call the office with any issues.  Patient to follow-up with me in 6 months.  Patient is agreeable to the plan.          Follow Up       Follow Up   Return in about 6 months (around 5/27/2025) for FATTY LIVER.  Patient was given instructions and counseling regarding his condition or for health maintenance advice. Please see specific information pulled into the AVS if appropriate.

## 2025-02-19 ENCOUNTER — LAB REQUISITION (OUTPATIENT)
Dept: LAB | Facility: HOSPITAL | Age: 50
End: 2025-02-19
Payer: COMMERCIAL

## 2025-02-19 DIAGNOSIS — D22.22 MELANOCYTIC NEVI OF LEFT EAR AND EXTERNAL AURICULAR CANAL: ICD-10-CM

## 2025-02-19 PROCEDURE — 88305 TISSUE EXAM BY PATHOLOGIST: CPT | Performed by: OTOLARYNGOLOGY

## 2025-02-20 LAB
LAB AP CASE REPORT: NORMAL
PATH REPORT.FINAL DX SPEC: NORMAL
PATH REPORT.GROSS SPEC: NORMAL

## (undated) DEVICE — SOLIDIFIER LIQLOC PLS 1500CC BT

## (undated) DEVICE — Device

## (undated) DEVICE — Device: Brand: DEFENDO AIR/WATER/SUCTION AND BIOPSY VALVE

## (undated) DEVICE — THE SINGLE USE ETRAP – POLYP TRAP IS USED FOR SUCTION RETRIEVAL OF ENDOSCOPICALLY REMOVED POLYPS.: Brand: ETRAP

## (undated) DEVICE — PAD GRND REM POLYHESIVE A/ DISP

## (undated) DEVICE — SOL IRRG H2O PL/BG 1000ML STRL

## (undated) DEVICE — SNAR E/S POLYP SNAREMASTER OVL/10MM 2.8X2300MM YEL

## (undated) DEVICE — CONN JET HYDRA H20 AUXILIARY DISP

## (undated) DEVICE — LINER SURG CANSTR SXN S/RIGD 1500CC